# Patient Record
Sex: FEMALE | Race: WHITE | NOT HISPANIC OR LATINO | Employment: FULL TIME | URBAN - METROPOLITAN AREA
[De-identification: names, ages, dates, MRNs, and addresses within clinical notes are randomized per-mention and may not be internally consistent; named-entity substitution may affect disease eponyms.]

---

## 2017-07-26 ENCOUNTER — OFFICE VISIT (OUTPATIENT)
Dept: FAMILY MEDICINE | Facility: CLINIC | Age: 57
End: 2017-07-26
Payer: COMMERCIAL

## 2017-07-26 VITALS
RESPIRATION RATE: 16 BRPM | HEIGHT: 66 IN | BODY MASS INDEX: 25.55 KG/M2 | SYSTOLIC BLOOD PRESSURE: 124 MMHG | DIASTOLIC BLOOD PRESSURE: 82 MMHG | TEMPERATURE: 98 F | WEIGHT: 159 LBS

## 2017-07-26 DIAGNOSIS — H60.22 ACUTE MALIGNANT OTITIS EXTERNA OF LEFT EAR: ICD-10-CM

## 2017-07-26 DIAGNOSIS — Z00.00 ROUTINE HEALTH MAINTENANCE: ICD-10-CM

## 2017-07-26 DIAGNOSIS — J06.9 URI, ACUTE: ICD-10-CM

## 2017-07-26 PROCEDURE — 99213 OFFICE O/P EST LOW 20 MIN: CPT | Mod: ,,, | Performed by: FAMILY MEDICINE

## 2017-07-26 RX ORDER — AZELASTINE 1 MG/ML
SPRAY, METERED NASAL
COMMUNITY
Start: 2017-07-10 | End: 2017-07-26 | Stop reason: ALTCHOICE

## 2017-07-26 RX ORDER — CIPROFLOXACIN AND DEXAMETHASONE 3; 1 MG/ML; MG/ML
SUSPENSION/ DROPS AURICULAR (OTIC)
COMMUNITY
Start: 2017-07-10 | End: 2017-07-26 | Stop reason: ALTCHOICE

## 2017-07-26 RX ORDER — ACETAMINOPHEN AND CODEINE PHOSPHATE 300; 30 MG/1; MG/1
TABLET ORAL
COMMUNITY
Start: 2017-07-10 | End: 2017-07-26 | Stop reason: ALTCHOICE

## 2017-07-26 RX ORDER — CEFDINIR 300 MG/1
CAPSULE ORAL
COMMUNITY
Start: 2017-07-10 | End: 2017-07-26 | Stop reason: ALTCHOICE

## 2017-07-26 RX ORDER — MUPIROCIN 20 MG/G
OINTMENT TOPICAL
COMMUNITY
Start: 2017-07-10 | End: 2021-07-30

## 2017-07-26 NOTE — PROGRESS NOTES
Subjective:       Patient ID: Heidy Phillips is a 57 y.o. female.    Chief Complaint: Sinusitis and Otalgia    This a follow-up visit from urgent care she was seen with earache severe with a surrounding soft tissue swelling. Given Ceftin, cipro gtt.        Sinusitis   This is a recurrent (f/u) problem. The problem has been gradually improving since onset. There has been no fever. Her pain is at a severity of 1/10. Associated symptoms include congestion, coughing, ear pain, a hoarse voice, neck pain and a sore throat. Past treatments include nothing. The treatment provided mild relief.   Otalgia    Associated symptoms include coughing, neck pain and a sore throat.     Review of Systems   HENT: Positive for congestion, ear pain, hoarse voice and sore throat.    Respiratory: Positive for cough.    Musculoskeletal: Positive for neck pain.       Objective:      Physical Exam   HENT:   Head: Normocephalic and atraumatic.   Right Ear: Hearing, tympanic membrane, external ear and ear canal normal.   Left Ear: Hearing, tympanic membrane, external ear and ear canal normal.   Ears:    Nose: Nose normal. No mucosal edema, rhinorrhea, nose lacerations or sinus tenderness.   Mouth/Throat: Oropharynx is clear and moist.   Eyes: Conjunctivae and EOM are normal. Pupils are equal, round, and reactive to light. Right eye exhibits discharge. Left eye exhibits discharge. Scleral icterus is present.   Neck: Normal range of motion. Neck supple. No thyromegaly present.   Cardiovascular: Normal rate, regular rhythm, normal heart sounds and intact distal pulses.  Exam reveals no gallop and no friction rub.    No murmur heard.  Pulmonary/Chest:           Assessment:       1. 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 URI, acute    3. Routine health maintenance        Plan:       Heidy was seen today for sinusitis and otalgia.    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

## 2017-07-26 NOTE — PATIENT INSTRUCTIONS
Dry Ear Ear Drops:  For post-wash protection or itchy ears  Mix 1 oz white vinegar with 1oz baby oil and store in a dropper bottle.  Shake to mix prior to use.  Apply 3 drops, 3 times a day for 3 days. May continue use if needed.    Silicon ear tips

## 2017-09-06 ENCOUNTER — TELEPHONE (OUTPATIENT)
Dept: FAMILY MEDICINE | Facility: CLINIC | Age: 57
End: 2017-09-06

## 2017-09-06 NOTE — TELEPHONE ENCOUNTER
----- Message from Alpesh Campos MD sent at 9/6/2017  8:37 AM CDT -----  Mammogram negative. return to clinic in 1 year for reassessment

## 2017-09-15 ENCOUNTER — OFFICE VISIT (OUTPATIENT)
Dept: FAMILY MEDICINE | Facility: CLINIC | Age: 57
End: 2017-09-15
Payer: COMMERCIAL

## 2017-09-15 VITALS
TEMPERATURE: 99 F | WEIGHT: 158 LBS | DIASTOLIC BLOOD PRESSURE: 70 MMHG | HEIGHT: 67 IN | SYSTOLIC BLOOD PRESSURE: 110 MMHG | HEART RATE: 76 BPM | RESPIRATION RATE: 16 BRPM | BODY MASS INDEX: 24.8 KG/M2

## 2017-09-15 DIAGNOSIS — M12.819 ROTATOR CUFF ARTHROPATHY, UNSPECIFIED LATERALITY: ICD-10-CM

## 2017-09-15 DIAGNOSIS — Z00.00 WELL ADULT EXAM: Primary | ICD-10-CM

## 2017-09-15 DIAGNOSIS — F17.200 SMOKING: ICD-10-CM

## 2017-09-15 PROCEDURE — 99396 PREV VISIT EST AGE 40-64: CPT | Mod: ,,, | Performed by: FAMILY MEDICINE

## 2017-09-15 NOTE — PROGRESS NOTES
Subjective:       Patient ID: Heidy Phillips is a 57 y.o. female.    Chief Complaint: Annual Exam    If her annual visit complaint her ear infection has resolved.      Review of Systems   Constitutional: Negative for activity change, appetite change, chills, diaphoresis, fatigue, fever and unexpected weight change.   HENT: Positive for postnasal drip and rhinorrhea. Negative for congestion, dental problem, drooling, ear discharge, ear pain, facial swelling, hearing loss, mouth sores, nosebleeds, sinus pain, sinus pressure, sneezing, sore throat, tinnitus, trouble swallowing and voice change.    Eyes: Negative for photophobia, pain, discharge, redness, itching and visual disturbance.   Respiratory: Positive for cough and shortness of breath (episodic). Negative for apnea, choking, chest tightness, wheezing and stridor.    Cardiovascular: Positive for leg swelling (worse with activity). Negative for chest pain and palpitations.   Gastrointestinal: Negative for abdominal distention, abdominal pain, anal bleeding, blood in stool, constipation, diarrhea, nausea, rectal pain and vomiting.   Endocrine: Negative for cold intolerance, heat intolerance, polydipsia, polyphagia and polyuria.   Genitourinary: Negative for decreased urine volume, difficulty urinating, dyspareunia, dysuria, enuresis, flank pain, frequency, genital sores, hematuria, menstrual problem, pelvic pain, urgency, vaginal bleeding, vaginal discharge and vaginal pain.   Musculoskeletal: Positive for arthralgias (left shoulder back of shoulder blade.) and back pain (t spine R scapula). Negative for gait problem, joint swelling, myalgias, neck pain and neck stiffness.   Skin: Negative for color change, pallor, rash and wound.   Allergic/Immunologic: Positive for environmental allergies. Negative for food allergies and immunocompromised state.   Neurological: Negative for dizziness, tremors, seizures, syncope, facial asymmetry, speech difficulty,  weakness, light-headedness, numbness and headaches.   Hematological: Negative for adenopathy. Does not bruise/bleed easily.   Psychiatric/Behavioral: Negative for agitation, behavioral problems, confusion, decreased concentration, dysphoric mood, hallucinations, self-injury, sleep disturbance and suicidal ideas. The patient is not nervous/anxious and is not hyperactive.        Objective:      Physical Exam   Constitutional: She is oriented to person, place, and time. She appears well-developed and well-nourished. No distress.   HENT:   Head: Normocephalic and atraumatic.   Right Ear: External ear normal.   Left Ear: External ear normal.   Nose: Nose normal.   Mouth/Throat: Oropharynx is clear and moist. No oropharyngeal exudate.   Eyes: Conjunctivae and EOM are normal. Pupils are equal, round, and reactive to light. Right eye exhibits no discharge. Left eye exhibits no discharge. No scleral icterus.   Neck: Normal range of motion. Neck supple. No JVD present. No tracheal deviation present. No thyromegaly present.   Cardiovascular: Normal rate, regular rhythm, normal heart sounds and intact distal pulses.  Exam reveals no gallop and no friction rub.    No murmur heard.  Pulmonary/Chest: Effort normal and breath sounds normal. No stridor. No respiratory distress. She has no wheezes. She has no rales. She exhibits no tenderness.   Abdominal: Soft. Bowel sounds are normal. She exhibits no distension and no mass. There is no tenderness. There is no rebound and no guarding. No hernia.   Genitourinary: Vagina normal and uterus normal. Rectal exam shows guaiac negative stool. No vaginal discharge found.   Musculoskeletal: She exhibits no edema or deformity.        Right shoulder: She exhibits tenderness, pain, spasm and decreased strength. She exhibits normal range of motion.   Lymphadenopathy:     She has no cervical adenopathy.   Neurological: She is alert and oriented to person, place, and time. She has normal reflexes.  She displays normal reflexes. No cranial nerve deficit or sensory deficit. She exhibits normal muscle tone. Coordination normal.   Skin: Skin is warm and dry. Capillary refill takes less than 2 seconds. No rash noted. She is not diaphoretic. No erythema. No pallor.   Psychiatric: She has a normal mood and affect. Her behavior is normal. Judgment and thought content normal.       Assessment:       1. Well adult exam    2. Smoking    3. Rotator cuff arthropathy, unspecified laterality        Plan:       Heidy was seen today for annual exam.    Diagnoses and all orders for this visit:    Well adult exam  -     Hemoglobin A1c; Future  -     Basic metabolic panel; Future  -     Hepatic function panel; Future  -     Hepatitis C antibody; Future  -     Lipid panel; Future  -     Hemoglobin A1c  -     Basic metabolic panel  -     Hepatic function panel  -     Hepatitis C antibody  -     Lipid panel    Smoking  -     CT Chest Lung Screening Low Dose    Rotator cuff arthropathy, unspecified laterality  -     Ambulatory Referral to Physical/Occupational Therapy         Return in about 1 year (around 9/15/2018).

## 2017-09-15 NOTE — PATIENT INSTRUCTIONS
[employer] exer    Exercises at Your Workstation: Shoulders  Tight shoulders? Aching back? A few easy moves can help your shoulders and back feel better. Take a few minutes during your day to do these exercises, right at your desk. They'll loosen up your muscles, keep you more alert, and make a big difference in how you work and feel.  Breathe deeply as you do your exercises. Inhale through your nose, and exhale through your mouth.        Shoulder raise Back press   For your shoulders  Warm-up  · Drop your head gently to your chest. While breathing in, slowly roll your head up to your left shoulder. While breathing out, slowly roll your head back to center. Repeat to the right.  · Repeat 3 times on each side.  Shoulder raise  · Slowly raise your shoulders toward your ears. Hold for a few seconds.  · Slowly bring your shoulders down and relax.  · Repeat 3 times.  Back press  · Put your hands up, forearms raised.  · Push your arms back, squeezing your shoulder blades. Hold for a few seconds, then relax.  · Repeat 3 times.  If you feel pain while doing these stretching exercises, please stop and consult your doctor.   Date Last Reviewed: 12/28/2015  © 3141-4504 [employer]. [address] [place] [address], [place] [address]. All rights reserved. This information is not intended as a substitute for professional medical care. Always follow your healthcare professional's instructions.

## 2017-09-25 ENCOUNTER — TELEPHONE (OUTPATIENT)
Dept: FAMILY MEDICINE | Facility: CLINIC | Age: 57
End: 2017-09-25

## 2017-09-25 DIAGNOSIS — Z87.891 PERSONAL HISTORY OF TOBACCO USE, PRESENTING HAZARDS TO HEALTH: Primary | ICD-10-CM

## 2017-09-25 NOTE — TELEPHONE ENCOUNTER
----- Message from Ana Jha sent at 9/25/2017  1:28 PM CDT -----  Contact: pt   Please have  change the Dx on [address] To [phone]  CTP Code .  [facility] will only cover for these codes.  Order changed please sign

## 2017-09-28 LAB
ALBUMIN SERPL-MCNC: 4.6 G/DL (ref 3.1–4.7)
ALP SERPL-CCNC: 71 IU/L (ref 40–104)
ALT (SGPT): 12 IU/L (ref 3–33)
AST SERPL-CCNC: 16 IU/L (ref 10–40)
BILIRUB SERPL-MCNC: 0.3 MG/DL (ref 0.3–1)
BILIRUBIN DIRECT+TOT PNL SERPL-MCNC: <0.1 MG/DL (ref 0–0.2)
BUN SERPL-MCNC: 21 MG/DL (ref 8–20)
CALCIUM SERPL-MCNC: 9.3 MG/DL (ref 7.7–10.4)
CHLORIDE: 105 MMOL/L (ref 98–110)
CO2 SERPL-SCNC: 26.9 MMOL/L (ref 22.8–31.6)
CREATININE: 0.94 MG/DL (ref 0.6–1.4)
GLUCOSE: 89 MG/DL (ref 70–99)
HBA1C MFR BLD: 5.4 % (ref 3.1–6.5)
POTASSIUM SERPL-SCNC: 4.2 MMOL/L (ref 3.5–5)
PROT SERPL-MCNC: 7.4 G/DL (ref 6–8.2)
SODIUM: 142 MMOL/L (ref 134–144)

## 2017-09-29 LAB — HCV AB SERPL QL IA: <0.1 S/CO RATIO (ref 0–0.9)

## 2017-10-30 PROBLEM — Z00.00 ROUTINE HEALTH MAINTENANCE: Status: RESOLVED | Noted: 2017-07-26 | Resolved: 2017-10-30

## 2020-07-13 ENCOUNTER — TELEPHONE (OUTPATIENT)
Dept: DERMATOLOGY | Facility: CLINIC | Age: 60
End: 2020-07-13

## 2020-07-13 NOTE — TELEPHONE ENCOUNTER
----- Message from Paul Locke sent at 7/13/2020  2:37 PM CDT -----  Contact: pt  Type:  Sooner Apoointment Request    Caller is requesting a sooner appointment.  Caller declined first available appointment listed below.  Caller will not accept being placed on the waitlist and is requesting a message be sent to doctor.    Name of Caller:  pt  When is the first available appointment?  07/24/2020  Symptoms:  cyst on top of head  Best Call Back Number:  [phone]  Additional Information:

## 2020-07-14 ENCOUNTER — OFFICE VISIT (OUTPATIENT)
Dept: DERMATOLOGY | Facility: CLINIC | Age: 60
End: 2020-07-14
Payer: COMMERCIAL

## 2020-07-14 DIAGNOSIS — L02.91 ABSCESS: Primary | ICD-10-CM

## 2020-07-14 DIAGNOSIS — L72.0 EPIDERMAL CYST: ICD-10-CM

## 2020-07-14 DIAGNOSIS — L73.9 FOLLICULITIS: ICD-10-CM

## 2020-07-14 PROCEDURE — 99202 OFFICE O/P NEW SF 15 MIN: CPT | Mod: S$GLB,,, | Performed by: DERMATOLOGY

## 2020-07-14 PROCEDURE — 99999 PR PBB SHADOW E&M-EST. PATIENT-LVL II: ICD-10-PCS | Mod: PBBFAC,,, | Performed by: DERMATOLOGY

## 2020-07-14 PROCEDURE — 99999 PR PBB SHADOW E&M-EST. PATIENT-LVL II: CPT | Mod: PBBFAC,,, | Performed by: DERMATOLOGY

## 2020-07-14 PROCEDURE — 99202 PR OFFICE/OUTPT VISIT, NEW, LEVL II, 15-29 MIN: ICD-10-PCS | Mod: S$GLB,,, | Performed by: DERMATOLOGY

## 2020-07-14 RX ORDER — PREDNISONE 10 MG/1
10 TABLET ORAL DAILY
Qty: 4 TABLET | Refills: 0 | Status: SHIPPED | OUTPATIENT
Start: 2020-07-14 | End: 2020-07-18

## 2020-07-14 RX ORDER — CEPHALEXIN 500 MG/1
500 CAPSULE ORAL 3 TIMES DAILY
Qty: 30 CAPSULE | Refills: 0 | Status: SHIPPED | OUTPATIENT
Start: 2020-07-14 | End: 2020-07-24

## 2020-07-28 ENCOUNTER — OFFICE VISIT (OUTPATIENT)
Dept: DERMATOLOGY | Facility: CLINIC | Age: 60
End: 2020-07-28
Payer: COMMERCIAL

## 2020-07-28 DIAGNOSIS — L40.9 SCALP PSORIASIS: ICD-10-CM

## 2020-07-28 DIAGNOSIS — L72.0 EPIDERMAL CYST: ICD-10-CM

## 2020-07-28 DIAGNOSIS — L65.9 ALOPECIA: ICD-10-CM

## 2020-07-28 DIAGNOSIS — L02.91 ABSCESS: Primary | ICD-10-CM

## 2020-07-28 PROCEDURE — 99999 PR PBB SHADOW E&M-EST. PATIENT-LVL II: CPT | Mod: PBBFAC,,, | Performed by: DERMATOLOGY

## 2020-07-28 PROCEDURE — 99999 PR PBB SHADOW E&M-EST. PATIENT-LVL II: ICD-10-PCS | Mod: PBBFAC,,, | Performed by: DERMATOLOGY

## 2020-07-28 PROCEDURE — 99213 PR OFFICE/OUTPT VISIT, EST, LEVL III, 20-29 MIN: ICD-10-PCS | Mod: S$GLB,,, | Performed by: DERMATOLOGY

## 2020-07-28 PROCEDURE — 99213 OFFICE O/P EST LOW 20 MIN: CPT | Mod: S$GLB,,, | Performed by: DERMATOLOGY

## 2020-07-28 RX ORDER — FLUOCINONIDE TOPICAL SOLUTION USP, 0.05% 0.5 MG/ML
SOLUTION TOPICAL
Qty: 60 ML | Refills: 2 | Status: SHIPPED | OUTPATIENT
Start: 2020-07-28 | End: 2023-03-01

## 2020-07-28 NOTE — PROGRESS NOTES
Subjective:       Patient ID:  Heidy Phillips is a 60 y.o. female who presents for 2 follow up head cyst. States doing better, has disappeared.  Chief Complaint   Patient presents with    Follow-up     head cyst     Bump drained and flattened out.  No pain.  Done with pills.    Also has years of off and on rash of post scalp.  Itches.      Review of Systems   Constitutional: Negative for fever.   HENT: Negative for sore throat.    Respiratory: Negative for cough.    Skin: Positive for itching and rash.        Objective:    Physical Exam   Constitutional: She appears well-developed and well-nourished.   Eyes: No conjunctival no injection.   Lymphadenopathy:     She has no cervical adenopathy.   Neurological: She is alert and oriented to person, place, and time.   Psychiatric: She has a normal mood and affect.   Skin:   Areas Examined (abnormalities noted in diagram):   Scalp / Hair Palpated and Inspected  Head / Face Inspection Performed  Neck Inspection Performed  RUE Inspected  LUE Inspection Performed              Diagram Legend     Erythematous scaling macule/papule c/w actinic keratosis       Vascular papule c/w angioma      Pigmented verrucoid papule/plaque c/w seborrheic keratosis      Yellow umbilicated papule c/w sebaceous hyperplasia      Irregularly shaped tan macule c/w lentigo     1-2 mm smooth white papules consistent with Milia      Movable subcutaneous cyst with punctum c/w epidermal inclusion cyst      Subcutaneous movable cyst c/w pilar cyst      Firm pink to brown papule c/w dermatofibroma      Pedunculated fleshy papule(s) c/w skin tag(s)      Evenly pigmented macule c/w junctional nevus     Mildly variegated pigmented, slightly irregular-bordered macule c/w mildly atypical nevus      Flesh colored to evenly pigmented papule c/w intradermal nevus       Pink pearly papule/plaque c/w basal cell carcinoma      Erythematous hyperkeratotic cursted plaque c/w SCC      Surgical scar with no  sign of skin cancer recurrence      Open and closed comedones      Inflammatory papules and pustules      Verrucoid papule consistent consistent with wart     Erythematous eczematous patches and plaques     Dystrophic onycholytic nail with subungual debris c/w onychomycosis     Umbilicated papule    Erythematous-base heme-crusted tan verrucoid plaque consistent with inflamed seborrheic keratosis     Erythematous Silvery Scaling Plaque c/w Psoriasis     See annotation      Assessment / Plan:        Abscess  Resolved.  Patient to contact us for earlier follow up if anything recurs.    Epidermal cyst  Resolved.  Patient to contact us for earlier follow up if anything recurs.  Discussed with patient the likelihood that this lesion is an epidermal cyst caused by an involuted lining of skin with dead skin trapped inside.  Cysts do not have a malignant potential but can become infected and turn into abscesses with possible cellulitis.  Discussed the option of warm compresses if infected with oral antibiotics.  Discussed the option of surgical excision with scar, possible recurrence, hematoma, and infection.  Patient to consider these options.    Alopecia  Discussed with patient the etiology and pathogenesis of the disease or skin lesion(s) and possible treatments and aggravators.    Regrowth should occur slowly.  Patient and or guardian to monitor this area/lesion or these areas/lesions for changes or worsening.  Patient and or guardian to contact us if any changes are noted for such.    Scalp psoriasis  -     fluocinonide (LIDEX) 0.05 % external solution; AAA scalp qday - bid prn pruritus or rash  Dispense: 60 mL; Refill: 2  No hot water bathing reviewed.  Patient to start 5% crude tar or otc weaker shampoo to be used as scalp soaks for at least 3 minutes, longer if possible, per their regular shampooing schedule.  Reviewed with patient different treatment options and associated risks.  Discussed with patient the etiology  and pathogenesis of the disease or skin lesion(s) and possible treatments and aggravators.    Proper application of medications and or care for affected area(s) and condition(s) reviewed.             Follow up in about 1 year (around 7/28/2021).

## 2021-06-07 DIAGNOSIS — Z12.31 ENCOUNTER FOR SCREENING MAMMOGRAM FOR MALIGNANT NEOPLASM OF BREAST: Primary | ICD-10-CM

## 2021-06-11 ENCOUNTER — HOSPITAL ENCOUNTER (OUTPATIENT)
Dept: RADIOLOGY | Facility: HOSPITAL | Age: 61
Discharge: HOME | End: 2021-06-11
Attending: SPECIALIST
Payer: COMMERCIAL

## 2021-06-11 DIAGNOSIS — Z12.31 ENCOUNTER FOR SCREENING MAMMOGRAM FOR MALIGNANT NEOPLASM OF BREAST: ICD-10-CM

## 2021-06-11 PROCEDURE — 77067 SCR MAMMO BI INCL CAD: CPT | Mod: TC,PO

## 2021-07-08 ENCOUNTER — HOSPITAL ENCOUNTER (OUTPATIENT)
Dept: RADIOLOGY | Facility: HOSPITAL | Age: 61
Discharge: HOME | End: 2021-07-08
Attending: SPECIALIST
Payer: COMMERCIAL

## 2021-07-08 DIAGNOSIS — R92.2 INCONCLUSIVE MAMMOGRAM: ICD-10-CM

## 2021-07-08 PROCEDURE — 76642 ULTRASOUND BREAST LIMITED: CPT | Mod: TC,PO,RT

## 2021-07-30 ENCOUNTER — HOSPITAL ENCOUNTER (OUTPATIENT)
Dept: PREADMISSION TESTING | Facility: HOSPITAL | Age: 61
Discharge: HOME | End: 2021-07-30
Attending: SURGERY
Payer: COMMERCIAL

## 2021-07-30 ENCOUNTER — HOSPITAL ENCOUNTER (OUTPATIENT)
Dept: RADIOLOGY | Facility: HOSPITAL | Age: 61
Discharge: HOME | End: 2021-07-30
Attending: SURGERY
Payer: COMMERCIAL

## 2021-07-30 VITALS
HEIGHT: 66 IN | WEIGHT: 150.88 LBS | BODY MASS INDEX: 24.25 KG/M2 | RESPIRATION RATE: 17 BRPM | OXYGEN SATURATION: 96 % | TEMPERATURE: 97 F | DIASTOLIC BLOOD PRESSURE: 88 MMHG | HEART RATE: 60 BPM | SYSTOLIC BLOOD PRESSURE: 139 MMHG

## 2021-07-30 DIAGNOSIS — N63.11 BREAST LUMP ON RIGHT SIDE AT 10 O'CLOCK POSITION: ICD-10-CM

## 2021-07-30 DIAGNOSIS — Z01.818 PRE-OP TESTING: Primary | ICD-10-CM

## 2021-07-30 DIAGNOSIS — N63.11 BREAST LUMP ON RIGHT SIDE AT 10 O'CLOCK POSITION: Primary | ICD-10-CM

## 2021-07-30 DIAGNOSIS — N63.11 MASS OF UPPER OUTER QUADRANT OF RIGHT BREAST: ICD-10-CM

## 2021-07-30 PROCEDURE — 19285 PERQ DEV BREAST 1ST US IMAG: CPT

## 2021-07-30 PROCEDURE — 93010 ELECTROCARDIOGRAM REPORT: CPT | Mod: ,,, | Performed by: INTERNAL MEDICINE

## 2021-07-30 PROCEDURE — 71046 X-RAY EXAM CHEST 2 VIEWS: CPT | Mod: TC

## 2021-07-30 PROCEDURE — A4550 SURGICAL TRAYS: HCPCS

## 2021-07-30 PROCEDURE — 93010 EKG 12-LEAD: ICD-10-PCS | Mod: ,,, | Performed by: INTERNAL MEDICINE

## 2021-07-30 PROCEDURE — 93005 ELECTROCARDIOGRAM TRACING: CPT | Performed by: INTERNAL MEDICINE

## 2021-07-30 RX ORDER — CEFAZOLIN SODIUM 1 G/3ML
2 INJECTION, POWDER, FOR SOLUTION INTRAMUSCULAR; INTRAVENOUS ONCE
Status: CANCELLED | OUTPATIENT
Start: 2021-07-02

## 2021-08-02 ENCOUNTER — HOSPITAL ENCOUNTER (OUTPATIENT)
Facility: HOSPITAL | Age: 61
Discharge: HOME | End: 2021-08-02
Attending: SURGERY | Admitting: SURGERY
Payer: COMMERCIAL

## 2021-08-02 ENCOUNTER — ANESTHESIA (OUTPATIENT)
Dept: SURGERY | Facility: HOSPITAL | Age: 61
End: 2021-08-02
Payer: COMMERCIAL

## 2021-08-02 ENCOUNTER — ANESTHESIA EVENT (OUTPATIENT)
Dept: SURGERY | Facility: HOSPITAL | Age: 61
End: 2021-08-02
Payer: COMMERCIAL

## 2021-08-02 VITALS
HEIGHT: 66 IN | RESPIRATION RATE: 16 BRPM | SYSTOLIC BLOOD PRESSURE: 138 MMHG | TEMPERATURE: 98 F | DIASTOLIC BLOOD PRESSURE: 89 MMHG | HEART RATE: 74 BPM | WEIGHT: 151 LBS | BODY MASS INDEX: 24.27 KG/M2 | OXYGEN SATURATION: 100 %

## 2021-08-02 DIAGNOSIS — N60.91 ATYPICAL DUCTAL HYPERPLASIA OF RIGHT BREAST: Primary | ICD-10-CM

## 2021-08-02 DIAGNOSIS — Z01.818 PRE-OP TESTING: ICD-10-CM

## 2021-08-02 PROCEDURE — C9290 INJ, BUPIVACAINE LIPOSOME: HCPCS | Performed by: SURGERY

## 2021-08-02 PROCEDURE — 25000003 PHARM REV CODE 250: Performed by: NURSE ANESTHETIST, CERTIFIED REGISTERED

## 2021-08-02 PROCEDURE — 71000033 HC RECOVERY, INTIAL HOUR: Performed by: SURGERY

## 2021-08-02 PROCEDURE — 25000003 PHARM REV CODE 250: Performed by: SURGERY

## 2021-08-02 PROCEDURE — 36000706: Performed by: SURGERY

## 2021-08-02 PROCEDURE — 63600175 PHARM REV CODE 636 W HCPCS: Performed by: SURGERY

## 2021-08-02 PROCEDURE — 36000707: Performed by: SURGERY

## 2021-08-02 PROCEDURE — 71000015 HC POSTOP RECOV 1ST HR: Performed by: SURGERY

## 2021-08-02 PROCEDURE — 27000671 HC TUBING MICROBORE EXT: Performed by: ANESTHESIOLOGY

## 2021-08-02 PROCEDURE — 37000008 HC ANESTHESIA 1ST 15 MINUTES: Performed by: SURGERY

## 2021-08-02 PROCEDURE — 37000009 HC ANESTHESIA EA ADD 15 MINS: Performed by: SURGERY

## 2021-08-02 PROCEDURE — 63600175 PHARM REV CODE 636 W HCPCS: Performed by: NURSE ANESTHETIST, CERTIFIED REGISTERED

## 2021-08-02 PROCEDURE — 27000673 HC TUBING BLOOD Y: Performed by: ANESTHESIOLOGY

## 2021-08-02 PROCEDURE — 63600175 PHARM REV CODE 636 W HCPCS: Performed by: ANESTHESIOLOGY

## 2021-08-02 PROCEDURE — 27201107 HC STYLET, STANDARD: Performed by: ANESTHESIOLOGY

## 2021-08-02 PROCEDURE — 27202105 HC BIS BILATERAL SENSOR: Performed by: ANESTHESIOLOGY

## 2021-08-02 PROCEDURE — 27201423 OPTIME MED/SURG SUP & DEVICES STERILE SUPPLY: Performed by: SURGERY

## 2021-08-02 PROCEDURE — 27000653 HC CATH, IV CATHLN: Performed by: ANESTHESIOLOGY

## 2021-08-02 PROCEDURE — 25000003 PHARM REV CODE 250: Performed by: ANESTHESIOLOGY

## 2021-08-02 RX ORDER — SODIUM CHLORIDE 0.9 % (FLUSH) 0.9 %
10 SYRINGE (ML) INJECTION
Status: DISCONTINUED | OUTPATIENT
Start: 2021-08-02 | End: 2021-08-02 | Stop reason: HOSPADM

## 2021-08-02 RX ORDER — PROPOFOL 10 MG/ML
VIAL (ML) INTRAVENOUS
Status: DISCONTINUED | OUTPATIENT
Start: 2021-08-02 | End: 2021-08-02

## 2021-08-02 RX ORDER — MIDAZOLAM HYDROCHLORIDE 1 MG/ML
INJECTION INTRAMUSCULAR; INTRAVENOUS
Status: DISCONTINUED | OUTPATIENT
Start: 2021-08-02 | End: 2021-08-02

## 2021-08-02 RX ORDER — BUPIVACAINE HCL/EPINEPHRINE 0.25-.0005
VIAL (ML) INJECTION
Status: DISCONTINUED | OUTPATIENT
Start: 2021-08-02 | End: 2021-08-02 | Stop reason: HOSPADM

## 2021-08-02 RX ORDER — DIPHENHYDRAMINE HYDROCHLORIDE 50 MG/ML
25 INJECTION INTRAMUSCULAR; INTRAVENOUS EVERY 6 HOURS PRN
Status: DISCONTINUED | OUTPATIENT
Start: 2021-08-02 | End: 2021-08-02 | Stop reason: HOSPADM

## 2021-08-02 RX ORDER — OXYCODONE HYDROCHLORIDE 5 MG/1
5 TABLET ORAL EVERY 4 HOURS PRN
Status: DISCONTINUED | OUTPATIENT
Start: 2021-08-02 | End: 2021-08-02 | Stop reason: HOSPADM

## 2021-08-02 RX ORDER — ONDANSETRON 2 MG/ML
INJECTION INTRAMUSCULAR; INTRAVENOUS
Status: DISCONTINUED | OUTPATIENT
Start: 2021-08-02 | End: 2021-08-02

## 2021-08-02 RX ORDER — HYDROCODONE BITARTRATE AND ACETAMINOPHEN 5; 325 MG/1; MG/1
1 TABLET ORAL EVERY 8 HOURS PRN
Qty: 12 TABLET | Refills: 0 | Status: ON HOLD | OUTPATIENT
Start: 2021-08-02 | End: 2021-08-17 | Stop reason: SDUPTHER

## 2021-08-02 RX ORDER — ONDANSETRON 4 MG/1
4 TABLET, ORALLY DISINTEGRATING ORAL ONCE
Status: DISCONTINUED | OUTPATIENT
Start: 2021-08-02 | End: 2021-08-02 | Stop reason: HOSPADM

## 2021-08-02 RX ORDER — HYDROCODONE BITARTRATE AND ACETAMINOPHEN 5; 325 MG/1; MG/1
1 TABLET ORAL EVERY 6 HOURS PRN
Status: CANCELLED | OUTPATIENT
Start: 2021-08-02

## 2021-08-02 RX ORDER — OXYCODONE HYDROCHLORIDE 5 MG/1
5 TABLET ORAL
Status: DISCONTINUED | OUTPATIENT
Start: 2021-08-02 | End: 2021-08-02 | Stop reason: HOSPADM

## 2021-08-02 RX ORDER — DEXAMETHASONE SODIUM PHOSPHATE 4 MG/ML
INJECTION, SOLUTION INTRA-ARTICULAR; INTRALESIONAL; INTRAMUSCULAR; INTRAVENOUS; SOFT TISSUE
Status: DISCONTINUED | OUTPATIENT
Start: 2021-08-02 | End: 2021-08-02

## 2021-08-02 RX ORDER — DEXAMETHASONE SODIUM PHOSPHATE 4 MG/ML
4 INJECTION, SOLUTION INTRA-ARTICULAR; INTRALESIONAL; INTRAMUSCULAR; INTRAVENOUS; SOFT TISSUE ONCE
Status: DISCONTINUED | OUTPATIENT
Start: 2021-08-02 | End: 2021-08-02 | Stop reason: HOSPADM

## 2021-08-02 RX ORDER — ACETAMINOPHEN 10 MG/ML
1000 INJECTION, SOLUTION INTRAVENOUS ONCE
Status: CANCELLED | OUTPATIENT
Start: 2021-08-02 | End: 2021-08-02

## 2021-08-02 RX ORDER — SUCCINYLCHOLINE CHLORIDE 20 MG/ML
INJECTION INTRAMUSCULAR; INTRAVENOUS
Status: DISCONTINUED | OUTPATIENT
Start: 2021-08-02 | End: 2021-08-02

## 2021-08-02 RX ORDER — GABAPENTIN 100 MG/1
100 CAPSULE ORAL ONCE
Status: COMPLETED | OUTPATIENT
Start: 2021-08-02 | End: 2021-08-02

## 2021-08-02 RX ORDER — ACETAMINOPHEN 10 MG/ML
INJECTION, SOLUTION INTRAVENOUS
Status: DISCONTINUED | OUTPATIENT
Start: 2021-08-02 | End: 2021-08-02

## 2021-08-02 RX ORDER — SCOLOPAMINE TRANSDERMAL SYSTEM 1 MG/1
1 PATCH, EXTENDED RELEASE TRANSDERMAL
Status: DISCONTINUED | OUTPATIENT
Start: 2021-08-02 | End: 2021-08-02 | Stop reason: HOSPADM

## 2021-08-02 RX ORDER — ONDANSETRON 2 MG/ML
4 INJECTION INTRAMUSCULAR; INTRAVENOUS DAILY PRN
Status: DISCONTINUED | OUTPATIENT
Start: 2021-08-02 | End: 2021-08-02 | Stop reason: HOSPADM

## 2021-08-02 RX ORDER — LIDOCAINE HYDROCHLORIDE 10 MG/ML
INJECTION, SOLUTION EPIDURAL; INFILTRATION; INTRACAUDAL; PERINEURAL
Status: DISCONTINUED | OUTPATIENT
Start: 2021-08-02 | End: 2021-08-02

## 2021-08-02 RX ORDER — FAMOTIDINE 10 MG/ML
INJECTION INTRAVENOUS
Status: DISCONTINUED | OUTPATIENT
Start: 2021-08-02 | End: 2021-08-02

## 2021-08-02 RX ORDER — ONDANSETRON 2 MG/ML
4 INJECTION INTRAMUSCULAR; INTRAVENOUS EVERY 12 HOURS PRN
Status: CANCELLED | OUTPATIENT
Start: 2021-08-02

## 2021-08-02 RX ORDER — ACETAMINOPHEN 325 MG/1
650 TABLET ORAL EVERY 4 HOURS PRN
Status: CANCELLED | OUTPATIENT
Start: 2021-08-02

## 2021-08-02 RX ORDER — CEFAZOLIN SODIUM 2 G/50ML
2 SOLUTION INTRAVENOUS ONCE
Status: COMPLETED | OUTPATIENT
Start: 2021-08-02 | End: 2021-08-02

## 2021-08-02 RX ORDER — ROCURONIUM BROMIDE 10 MG/ML
INJECTION, SOLUTION INTRAVENOUS
Status: DISCONTINUED | OUTPATIENT
Start: 2021-08-02 | End: 2021-08-02

## 2021-08-02 RX ORDER — PROCHLORPERAZINE EDISYLATE 5 MG/ML
5 INJECTION INTRAMUSCULAR; INTRAVENOUS EVERY 30 MIN PRN
Status: DISCONTINUED | OUTPATIENT
Start: 2021-08-02 | End: 2021-08-02 | Stop reason: HOSPADM

## 2021-08-02 RX ORDER — FENTANYL CITRATE 50 UG/ML
INJECTION, SOLUTION INTRAMUSCULAR; INTRAVENOUS
Status: DISCONTINUED | OUTPATIENT
Start: 2021-08-02 | End: 2021-08-02

## 2021-08-02 RX ORDER — EPHEDRINE SULFATE 50 MG/ML
INJECTION, SOLUTION INTRAVENOUS
Status: DISCONTINUED | OUTPATIENT
Start: 2021-08-02 | End: 2021-08-02

## 2021-08-02 RX ORDER — SODIUM CHLORIDE, SODIUM LACTATE, POTASSIUM CHLORIDE, CALCIUM CHLORIDE 600; 310; 30; 20 MG/100ML; MG/100ML; MG/100ML; MG/100ML
INJECTION, SOLUTION INTRAVENOUS CONTINUOUS PRN
Status: DISCONTINUED | OUTPATIENT
Start: 2021-08-02 | End: 2021-08-02

## 2021-08-02 RX ORDER — HYDROMORPHONE HYDROCHLORIDE 1 MG/ML
0.2 INJECTION, SOLUTION INTRAMUSCULAR; INTRAVENOUS; SUBCUTANEOUS
Status: DISCONTINUED | OUTPATIENT
Start: 2021-08-02 | End: 2021-08-02 | Stop reason: HOSPADM

## 2021-08-02 RX ORDER — MEPERIDINE HYDROCHLORIDE 50 MG/ML
12.5 INJECTION INTRAMUSCULAR; INTRAVENOUS; SUBCUTANEOUS EVERY 10 MIN PRN
Status: DISCONTINUED | OUTPATIENT
Start: 2021-08-02 | End: 2021-08-02 | Stop reason: HOSPADM

## 2021-08-02 RX ORDER — DIPHENHYDRAMINE HYDROCHLORIDE 50 MG/ML
12.5 INJECTION INTRAMUSCULAR; INTRAVENOUS EVERY 4 HOURS
Status: DISCONTINUED | OUTPATIENT
Start: 2021-08-02 | End: 2021-08-02 | Stop reason: HOSPADM

## 2021-08-02 RX ADMIN — LIDOCAINE HYDROCHLORIDE 80 MG: 10 INJECTION, SOLUTION EPIDURAL; INFILTRATION; INTRACAUDAL; PERINEURAL at 07:08

## 2021-08-02 RX ADMIN — SUGAMMADEX 200 MG: 100 INJECTION, SOLUTION INTRAVENOUS at 08:08

## 2021-08-02 RX ADMIN — ACETAMINOPHEN 1000 MG: 10 INJECTION, SOLUTION INTRAVENOUS at 07:08

## 2021-08-02 RX ADMIN — FENTANYL CITRATE 50 MCG: 50 INJECTION INTRAMUSCULAR; INTRAVENOUS at 08:08

## 2021-08-02 RX ADMIN — SODIUM CHLORIDE, SODIUM LACTATE, POTASSIUM CHLORIDE, AND CALCIUM CHLORIDE: .6; .31; .03; .02 INJECTION, SOLUTION INTRAVENOUS at 07:08

## 2021-08-02 RX ADMIN — EPHEDRINE SULFATE 10 MG: 50 INJECTION, SOLUTION INTRAVENOUS at 08:08

## 2021-08-02 RX ADMIN — CEFAZOLIN SODIUM 2 G: 2 SOLUTION INTRAVENOUS at 07:08

## 2021-08-02 RX ADMIN — ROCURONIUM BROMIDE 5 MG: 10 INJECTION, SOLUTION INTRAVENOUS at 07:08

## 2021-08-02 RX ADMIN — ONDANSETRON 4 MG: 2 INJECTION INTRAMUSCULAR; INTRAVENOUS at 07:08

## 2021-08-02 RX ADMIN — GABAPENTIN 100 MG: 100 CAPSULE ORAL at 06:08

## 2021-08-02 RX ADMIN — ONDANSETRON 4 MG: 2 INJECTION INTRAMUSCULAR; INTRAVENOUS at 09:08

## 2021-08-02 RX ADMIN — MIDAZOLAM HYDROCHLORIDE 2 MG: 1 INJECTION, SOLUTION INTRAMUSCULAR; INTRAVENOUS at 07:08

## 2021-08-02 RX ADMIN — FAMOTIDINE 20 MG: 10 INJECTION, SOLUTION INTRAVENOUS at 07:08

## 2021-08-02 RX ADMIN — DEXAMETHASONE SODIUM PHOSPHATE 8 MG: 4 INJECTION, SOLUTION INTRAMUSCULAR; INTRAVENOUS at 07:08

## 2021-08-02 RX ADMIN — SUCCINYLCHOLINE CHLORIDE 140 MG: 20 INJECTION, SOLUTION INTRAMUSCULAR; INTRAVENOUS at 07:08

## 2021-08-02 RX ADMIN — PROPOFOL 120 MG: 10 INJECTION, EMULSION INTRAVENOUS at 07:08

## 2021-08-02 RX ADMIN — SODIUM CHLORIDE, SODIUM LACTATE, POTASSIUM CHLORIDE, AND CALCIUM CHLORIDE: .6; .31; .03; .02 INJECTION, SOLUTION INTRAVENOUS at 08:08

## 2021-08-02 RX ADMIN — SCOPOLAMINE 1 PATCH: 1 PATCH TRANSDERMAL at 06:08

## 2021-08-02 RX ADMIN — FENTANYL CITRATE 50 MCG: 50 INJECTION INTRAMUSCULAR; INTRAVENOUS at 07:08

## 2021-08-16 ENCOUNTER — HOSPITAL ENCOUNTER (OUTPATIENT)
Dept: PREADMISSION TESTING | Facility: HOSPITAL | Age: 61
Discharge: HOME | End: 2021-08-16
Attending: SURGERY
Payer: COMMERCIAL

## 2021-08-16 VITALS
HEIGHT: 66 IN | SYSTOLIC BLOOD PRESSURE: 116 MMHG | BODY MASS INDEX: 24.41 KG/M2 | WEIGHT: 151.88 LBS | DIASTOLIC BLOOD PRESSURE: 76 MMHG | RESPIRATION RATE: 16 BRPM | TEMPERATURE: 98 F | OXYGEN SATURATION: 98 % | HEART RATE: 73 BPM

## 2021-08-16 DIAGNOSIS — Z01.818 PRE-OP TESTING: ICD-10-CM

## 2021-08-16 LAB — SARS-COV-2 RDRP RESP QL NAA+PROBE: NEGATIVE

## 2021-08-16 PROCEDURE — U0002 COVID-19 LAB TEST NON-CDC: HCPCS | Performed by: SURGERY

## 2021-08-16 RX ORDER — CEFAZOLIN SODIUM 2 G/50ML
2 SOLUTION INTRAVENOUS ONCE
Status: CANCELLED | OUTPATIENT
Start: 2021-08-17

## 2021-08-17 ENCOUNTER — HOSPITAL ENCOUNTER (OUTPATIENT)
Facility: HOSPITAL | Age: 61
Discharge: HOME | End: 2021-08-17
Attending: SURGERY | Admitting: SURGERY
Payer: COMMERCIAL

## 2021-08-17 ENCOUNTER — ANESTHESIA (OUTPATIENT)
Dept: SURGERY | Facility: HOSPITAL | Age: 61
End: 2021-08-17
Payer: COMMERCIAL

## 2021-08-17 ENCOUNTER — ANESTHESIA EVENT (OUTPATIENT)
Dept: SURGERY | Facility: HOSPITAL | Age: 61
End: 2021-08-17
Payer: COMMERCIAL

## 2021-08-17 VITALS
SYSTOLIC BLOOD PRESSURE: 129 MMHG | TEMPERATURE: 98 F | DIASTOLIC BLOOD PRESSURE: 79 MMHG | HEART RATE: 72 BPM | OXYGEN SATURATION: 97 % | RESPIRATION RATE: 15 BRPM

## 2021-08-17 DIAGNOSIS — D05.11 DUCTAL CARCINOMA IN SITU (DCIS) OF RIGHT BREAST: Primary | ICD-10-CM

## 2021-08-17 DIAGNOSIS — Z01.818 PRE-OP TESTING: ICD-10-CM

## 2021-08-17 PROCEDURE — A4648 IMPLANTABLE TISSUE MARKER: HCPCS | Performed by: SURGERY

## 2021-08-17 PROCEDURE — 27202107 HC XP QUATRO SENSOR: Performed by: ANESTHESIOLOGY

## 2021-08-17 PROCEDURE — C9290 INJ, BUPIVACAINE LIPOSOME: HCPCS | Performed by: SURGERY

## 2021-08-17 PROCEDURE — 27000657 HC HEADREST, PRONE: Performed by: ANESTHESIOLOGY

## 2021-08-17 PROCEDURE — 27000673 HC TUBING BLOOD Y: Performed by: ANESTHESIOLOGY

## 2021-08-17 PROCEDURE — 37000009 HC ANESTHESIA EA ADD 15 MINS: Performed by: SURGERY

## 2021-08-17 PROCEDURE — 63600175 PHARM REV CODE 636 W HCPCS: Performed by: NURSE ANESTHETIST, CERTIFIED REGISTERED

## 2021-08-17 PROCEDURE — 27201107 HC STYLET, STANDARD: Performed by: ANESTHESIOLOGY

## 2021-08-17 PROCEDURE — 36000707: Performed by: SURGERY

## 2021-08-17 PROCEDURE — 27000671 HC TUBING MICROBORE EXT: Performed by: ANESTHESIOLOGY

## 2021-08-17 PROCEDURE — 63600175 PHARM REV CODE 636 W HCPCS: Performed by: SURGERY

## 2021-08-17 PROCEDURE — 25000003 PHARM REV CODE 250: Performed by: NURSE ANESTHETIST, CERTIFIED REGISTERED

## 2021-08-17 PROCEDURE — 63600175 PHARM REV CODE 636 W HCPCS: Performed by: ANESTHESIOLOGY

## 2021-08-17 PROCEDURE — 37000008 HC ANESTHESIA 1ST 15 MINUTES: Performed by: SURGERY

## 2021-08-17 PROCEDURE — 71000033 HC RECOVERY, INTIAL HOUR: Performed by: SURGERY

## 2021-08-17 PROCEDURE — 25000003 PHARM REV CODE 250: Performed by: ANESTHESIOLOGY

## 2021-08-17 PROCEDURE — 27000284 HC CANNULA NASAL: Performed by: ANESTHESIOLOGY

## 2021-08-17 PROCEDURE — 71000015 HC POSTOP RECOV 1ST HR: Performed by: SURGERY

## 2021-08-17 PROCEDURE — 36000706: Performed by: SURGERY

## 2021-08-17 PROCEDURE — 27201423 OPTIME MED/SURG SUP & DEVICES STERILE SUPPLY: Performed by: SURGERY

## 2021-08-17 PROCEDURE — 25000003 PHARM REV CODE 250: Performed by: SURGERY

## 2021-08-17 DEVICE — IMPLANTABLE DEVICE: Type: IMPLANTABLE DEVICE | Site: BREAST | Status: FUNCTIONAL

## 2021-08-17 RX ORDER — FENTANYL CITRATE 50 UG/ML
INJECTION, SOLUTION INTRAMUSCULAR; INTRAVENOUS
Status: DISCONTINUED | OUTPATIENT
Start: 2021-08-17 | End: 2021-08-17

## 2021-08-17 RX ORDER — SODIUM CHLORIDE 0.9 % (FLUSH) 0.9 %
10 SYRINGE (ML) INJECTION
Status: DISCONTINUED | OUTPATIENT
Start: 2021-08-17 | End: 2021-08-17 | Stop reason: HOSPADM

## 2021-08-17 RX ORDER — PHENYLEPHRINE HYDROCHLORIDE 10 MG/ML
INJECTION INTRAVENOUS
Status: DISCONTINUED | OUTPATIENT
Start: 2021-08-17 | End: 2021-08-17

## 2021-08-17 RX ORDER — ROCURONIUM BROMIDE 10 MG/ML
INJECTION, SOLUTION INTRAVENOUS
Status: DISCONTINUED | OUTPATIENT
Start: 2021-08-17 | End: 2021-08-17

## 2021-08-17 RX ORDER — SUCCINYLCHOLINE CHLORIDE 20 MG/ML
INJECTION INTRAMUSCULAR; INTRAVENOUS
Status: DISCONTINUED | OUTPATIENT
Start: 2021-08-17 | End: 2021-08-17

## 2021-08-17 RX ORDER — LIDOCAINE HYDROCHLORIDE 20 MG/ML
JELLY TOPICAL
Status: DISCONTINUED | OUTPATIENT
Start: 2021-08-17 | End: 2021-08-17

## 2021-08-17 RX ORDER — ONDANSETRON 2 MG/ML
4 INJECTION INTRAMUSCULAR; INTRAVENOUS DAILY PRN
Status: DISCONTINUED | OUTPATIENT
Start: 2021-08-17 | End: 2021-08-17 | Stop reason: HOSPADM

## 2021-08-17 RX ORDER — CEFAZOLIN SODIUM 1 G/3ML
INJECTION, POWDER, FOR SOLUTION INTRAMUSCULAR; INTRAVENOUS
Status: DISCONTINUED | OUTPATIENT
Start: 2021-08-17 | End: 2021-08-17

## 2021-08-17 RX ORDER — SCOLOPAMINE TRANSDERMAL SYSTEM 1 MG/1
1 PATCH, EXTENDED RELEASE TRANSDERMAL
Status: DISCONTINUED | OUTPATIENT
Start: 2021-08-17 | End: 2021-08-17 | Stop reason: HOSPADM

## 2021-08-17 RX ORDER — MEPERIDINE HYDROCHLORIDE 50 MG/ML
12.5 INJECTION INTRAMUSCULAR; INTRAVENOUS; SUBCUTANEOUS EVERY 10 MIN PRN
Status: DISCONTINUED | OUTPATIENT
Start: 2021-08-17 | End: 2021-08-17 | Stop reason: HOSPADM

## 2021-08-17 RX ORDER — ACETAMINOPHEN 325 MG/1
650 TABLET ORAL EVERY 4 HOURS PRN
Status: CANCELLED | OUTPATIENT
Start: 2021-08-17

## 2021-08-17 RX ORDER — HYDROCODONE BITARTRATE AND ACETAMINOPHEN 5; 325 MG/1; MG/1
1 TABLET ORAL EVERY 8 HOURS PRN
Qty: 10 TABLET | Refills: 0 | Status: SHIPPED | OUTPATIENT
Start: 2021-08-17 | End: 2021-09-15

## 2021-08-17 RX ORDER — HYDROCODONE BITARTRATE AND ACETAMINOPHEN 5; 325 MG/1; MG/1
1 TABLET ORAL EVERY 6 HOURS PRN
Status: CANCELLED | OUTPATIENT
Start: 2021-08-17

## 2021-08-17 RX ORDER — OXYCODONE HYDROCHLORIDE 5 MG/1
5 TABLET ORAL
Status: DISCONTINUED | OUTPATIENT
Start: 2021-08-17 | End: 2021-08-17 | Stop reason: HOSPADM

## 2021-08-17 RX ORDER — ONDANSETRON 2 MG/ML
4 INJECTION INTRAMUSCULAR; INTRAVENOUS EVERY 12 HOURS PRN
Status: CANCELLED | OUTPATIENT
Start: 2021-08-17

## 2021-08-17 RX ORDER — HYDROMORPHONE HYDROCHLORIDE 1 MG/ML
0.2 INJECTION, SOLUTION INTRAMUSCULAR; INTRAVENOUS; SUBCUTANEOUS
Status: DISCONTINUED | OUTPATIENT
Start: 2021-08-17 | End: 2021-08-17 | Stop reason: HOSPADM

## 2021-08-17 RX ORDER — PROPOFOL 10 MG/ML
VIAL (ML) INTRAVENOUS
Status: DISCONTINUED | OUTPATIENT
Start: 2021-08-17 | End: 2021-08-17

## 2021-08-17 RX ORDER — LIDOCAINE HYDROCHLORIDE 20 MG/ML
INJECTION, SOLUTION EPIDURAL; INFILTRATION; INTRACAUDAL; PERINEURAL
Status: DISCONTINUED | OUTPATIENT
Start: 2021-08-17 | End: 2021-08-17

## 2021-08-17 RX ORDER — DIPHENHYDRAMINE HYDROCHLORIDE 50 MG/ML
12.5 INJECTION INTRAMUSCULAR; INTRAVENOUS
Status: DISCONTINUED | OUTPATIENT
Start: 2021-08-17 | End: 2021-08-17 | Stop reason: HOSPADM

## 2021-08-17 RX ORDER — FAMOTIDINE 10 MG/ML
INJECTION INTRAVENOUS
Status: DISCONTINUED | OUTPATIENT
Start: 2021-08-17 | End: 2021-08-17

## 2021-08-17 RX ORDER — DEXAMETHASONE SODIUM PHOSPHATE 4 MG/ML
INJECTION, SOLUTION INTRA-ARTICULAR; INTRALESIONAL; INTRAMUSCULAR; INTRAVENOUS; SOFT TISSUE
Status: DISCONTINUED | OUTPATIENT
Start: 2021-08-17 | End: 2021-08-17

## 2021-08-17 RX ORDER — BUPIVACAINE HYDROCHLORIDE AND EPINEPHRINE 5; 5 MG/ML; UG/ML
INJECTION, SOLUTION EPIDURAL; INTRACAUDAL; PERINEURAL
Status: DISCONTINUED | OUTPATIENT
Start: 2021-08-17 | End: 2021-08-17 | Stop reason: HOSPADM

## 2021-08-17 RX ORDER — ACETAMINOPHEN 10 MG/ML
INJECTION, SOLUTION INTRAVENOUS
Status: DISCONTINUED | OUTPATIENT
Start: 2021-08-17 | End: 2021-08-17

## 2021-08-17 RX ADMIN — CEFAZOLIN 2 G: 330 INJECTION, POWDER, FOR SOLUTION INTRAMUSCULAR; INTRAVENOUS at 08:08

## 2021-08-17 RX ADMIN — ROCURONIUM BROMIDE 40 MG: 10 INJECTION, SOLUTION INTRAVENOUS at 08:08

## 2021-08-17 RX ADMIN — ACETAMINOPHEN 1000 MG: 10 INJECTION, SOLUTION INTRAVENOUS at 08:08

## 2021-08-17 RX ADMIN — SUCCINYLCHOLINE CHLORIDE 160 MG: 20 INJECTION, SOLUTION INTRAMUSCULAR; INTRAVENOUS at 08:08

## 2021-08-17 RX ADMIN — HYDROMORPHONE HYDROCHLORIDE 0.2 MG: 1 INJECTION, SOLUTION INTRAMUSCULAR; INTRAVENOUS; SUBCUTANEOUS at 10:08

## 2021-08-17 RX ADMIN — DEXAMETHASONE SODIUM PHOSPHATE 8 MG: 4 INJECTION, SOLUTION INTRAMUSCULAR; INTRAVENOUS at 08:08

## 2021-08-17 RX ADMIN — ROCURONIUM BROMIDE 10 MG: 10 INJECTION, SOLUTION INTRAVENOUS at 08:08

## 2021-08-17 RX ADMIN — PROPOFOL 150 MG: 10 INJECTION, EMULSION INTRAVENOUS at 08:08

## 2021-08-17 RX ADMIN — PHENYLEPHRINE HYDROCHLORIDE 200 MCG: 10 INJECTION INTRAVENOUS at 09:08

## 2021-08-17 RX ADMIN — LIDOCAINE HYDROCHLORIDE 100 MG: 20 INJECTION, SOLUTION EPIDURAL; INFILTRATION; INTRACAUDAL; PERINEURAL at 08:08

## 2021-08-17 RX ADMIN — FAMOTIDINE 20 MG: 10 INJECTION, SOLUTION INTRAVENOUS at 08:08

## 2021-08-17 RX ADMIN — SUGAMMADEX 200 MG: 100 INJECTION, SOLUTION INTRAVENOUS at 09:08

## 2021-08-17 RX ADMIN — ONDANSETRON 4 MG: 2 INJECTION INTRAMUSCULAR; INTRAVENOUS at 08:08

## 2021-08-17 RX ADMIN — DIPHENHYDRAMINE HYDROCHLORIDE 6.25 MG: 50 INJECTION, SOLUTION INTRAMUSCULAR; INTRAVENOUS at 08:08

## 2021-08-17 RX ADMIN — SCOPOLAMINE 1 PATCH: 1 PATCH, EXTENDED RELEASE TRANSDERMAL at 09:08

## 2021-08-17 RX ADMIN — FENTANYL CITRATE 100 MCG: 50 INJECTION, SOLUTION INTRAMUSCULAR; INTRAVENOUS at 08:08

## 2021-08-17 RX ADMIN — LIDOCAINE HYDROCHLORIDE 5 ML: 20 JELLY TOPICAL at 08:08

## 2021-08-17 RX ADMIN — OXYCODONE HYDROCHLORIDE 5 MG: 5 TABLET ORAL at 10:08

## 2021-08-17 RX ADMIN — SODIUM CHLORIDE, SODIUM LACTATE, POTASSIUM CHLORIDE, AND CALCIUM CHLORIDE: .6; .31; .03; .02 INJECTION, SOLUTION INTRAVENOUS at 08:08

## 2021-09-02 ENCOUNTER — OFFICE VISIT (OUTPATIENT)
Dept: RADIATION ONCOLOGY | Facility: CLINIC | Age: 61
End: 2021-09-02
Payer: COMMERCIAL

## 2021-09-02 VITALS
BODY MASS INDEX: 24.13 KG/M2 | SYSTOLIC BLOOD PRESSURE: 111 MMHG | OXYGEN SATURATION: 98 % | WEIGHT: 149.5 LBS | HEART RATE: 73 BPM | TEMPERATURE: 98 F | DIASTOLIC BLOOD PRESSURE: 70 MMHG

## 2021-09-02 DIAGNOSIS — D05.11 DUCTAL CARCINOMA IN SITU (DCIS) OF RIGHT BREAST: Primary | ICD-10-CM

## 2021-09-02 PROCEDURE — 3008F PR BODY MASS INDEX (BMI) DOCUMENTED: ICD-10-PCS | Mod: S$GLB,,, | Performed by: RADIOLOGY

## 2021-09-02 PROCEDURE — 3078F PR MOST RECENT DIASTOLIC BLOOD PRESSURE < 80 MM HG: ICD-10-PCS | Mod: S$GLB,,, | Performed by: RADIOLOGY

## 2021-09-02 PROCEDURE — 99205 OFFICE O/P NEW HI 60 MIN: CPT | Mod: S$GLB,,, | Performed by: RADIOLOGY

## 2021-09-02 PROCEDURE — 3074F PR MOST RECENT SYSTOLIC BLOOD PRESSURE < 130 MM HG: ICD-10-PCS | Mod: S$GLB,,, | Performed by: RADIOLOGY

## 2021-09-02 PROCEDURE — 3078F DIAST BP <80 MM HG: CPT | Mod: S$GLB,,, | Performed by: RADIOLOGY

## 2021-09-02 PROCEDURE — 99205 PR OFFICE/OUTPT VISIT, NEW, LEVL V, 60-74 MIN: ICD-10-PCS | Mod: S$GLB,,, | Performed by: RADIOLOGY

## 2021-09-02 PROCEDURE — 3074F SYST BP LT 130 MM HG: CPT | Mod: S$GLB,,, | Performed by: RADIOLOGY

## 2021-09-02 PROCEDURE — 3008F BODY MASS INDEX DOCD: CPT | Mod: S$GLB,,, | Performed by: RADIOLOGY

## 2021-09-15 ENCOUNTER — OFFICE VISIT (OUTPATIENT)
Dept: HEMATOLOGY/ONCOLOGY | Facility: CLINIC | Age: 61
End: 2021-09-15
Payer: COMMERCIAL

## 2021-09-15 VITALS
DIASTOLIC BLOOD PRESSURE: 76 MMHG | HEART RATE: 64 BPM | SYSTOLIC BLOOD PRESSURE: 132 MMHG | HEIGHT: 66 IN | RESPIRATION RATE: 18 BRPM | BODY MASS INDEX: 24.11 KG/M2 | WEIGHT: 150 LBS

## 2021-09-15 DIAGNOSIS — D05.11 DUCTAL CARCINOMA IN SITU (DCIS) OF RIGHT BREAST: ICD-10-CM

## 2021-09-15 PROCEDURE — 3075F SYST BP GE 130 - 139MM HG: CPT | Mod: S$GLB,,, | Performed by: INTERNAL MEDICINE

## 2021-09-15 PROCEDURE — 3078F PR MOST RECENT DIASTOLIC BLOOD PRESSURE < 80 MM HG: ICD-10-PCS | Mod: S$GLB,,, | Performed by: INTERNAL MEDICINE

## 2021-09-15 PROCEDURE — 3075F PR MOST RECENT SYSTOLIC BLOOD PRESS GE 130-139MM HG: ICD-10-PCS | Mod: S$GLB,,, | Performed by: INTERNAL MEDICINE

## 2021-09-15 PROCEDURE — 99204 PR OFFICE/OUTPT VISIT, NEW, LEVL IV, 45-59 MIN: ICD-10-PCS | Mod: S$GLB,,, | Performed by: INTERNAL MEDICINE

## 2021-09-15 PROCEDURE — 3008F PR BODY MASS INDEX (BMI) DOCUMENTED: ICD-10-PCS | Mod: S$GLB,,, | Performed by: INTERNAL MEDICINE

## 2021-09-15 PROCEDURE — 3078F DIAST BP <80 MM HG: CPT | Mod: S$GLB,,, | Performed by: INTERNAL MEDICINE

## 2021-09-15 PROCEDURE — 3008F BODY MASS INDEX DOCD: CPT | Mod: S$GLB,,, | Performed by: INTERNAL MEDICINE

## 2021-09-15 PROCEDURE — 1160F RVW MEDS BY RX/DR IN RCRD: CPT | Mod: S$GLB,,, | Performed by: INTERNAL MEDICINE

## 2021-09-15 PROCEDURE — 99204 OFFICE O/P NEW MOD 45 MIN: CPT | Mod: S$GLB,,, | Performed by: INTERNAL MEDICINE

## 2021-09-15 PROCEDURE — 1160F PR REVIEW ALL MEDS BY PRESCRIBER/CLIN PHARMACIST DOCUMENTED: ICD-10-PCS | Mod: S$GLB,,, | Performed by: INTERNAL MEDICINE

## 2021-09-15 RX ORDER — ERGOCALCIFEROL 1.25 MG/1
50000 CAPSULE ORAL
COMMUNITY
Start: 2021-06-18 | End: 2022-06-18

## 2021-10-13 ENCOUNTER — TREATMENT (OUTPATIENT)
Dept: RADIATION ONCOLOGY | Facility: CLINIC | Age: 61
End: 2021-10-13
Payer: COMMERCIAL

## 2021-10-13 PROCEDURE — G6012 RADIATION TREATMENT DELIVERY: HCPCS | Mod: S$GLB,,, | Performed by: RADIOLOGY

## 2021-10-13 PROCEDURE — 77014 PR  CT GUIDANCE PLACEMENT RAD THERAPY FIELDS: CPT | Mod: S$GLB,,, | Performed by: RADIOLOGY

## 2021-10-13 PROCEDURE — 77014 PR  CT GUIDANCE PLACEMENT RAD THERAPY FIELDS: ICD-10-PCS | Mod: S$GLB,,, | Performed by: RADIOLOGY

## 2021-10-13 PROCEDURE — G6012 PR RADN TX DELIVERY, 6-10 MEV, >= 3 TX AREAS: ICD-10-PCS | Mod: S$GLB,,, | Performed by: RADIOLOGY

## 2021-11-15 ENCOUNTER — OFFICE VISIT (OUTPATIENT)
Dept: HEMATOLOGY/ONCOLOGY | Facility: CLINIC | Age: 61
End: 2021-11-15
Payer: COMMERCIAL

## 2021-11-15 VITALS
SYSTOLIC BLOOD PRESSURE: 136 MMHG | WEIGHT: 152.38 LBS | DIASTOLIC BLOOD PRESSURE: 74 MMHG | BODY MASS INDEX: 23.92 KG/M2 | HEIGHT: 67 IN | HEART RATE: 74 BPM | RESPIRATION RATE: 18 BRPM

## 2021-11-15 DIAGNOSIS — D05.11 DUCTAL CARCINOMA IN SITU (DCIS) OF RIGHT BREAST: ICD-10-CM

## 2021-11-15 PROCEDURE — 99213 PR OFFICE/OUTPT VISIT, EST, LEVL III, 20-29 MIN: ICD-10-PCS | Mod: S$GLB,,, | Performed by: INTERNAL MEDICINE

## 2021-11-15 PROCEDURE — 3008F BODY MASS INDEX DOCD: CPT | Mod: S$GLB,,, | Performed by: INTERNAL MEDICINE

## 2021-11-15 PROCEDURE — 99213 OFFICE O/P EST LOW 20 MIN: CPT | Mod: S$GLB,,, | Performed by: INTERNAL MEDICINE

## 2021-11-15 PROCEDURE — 3075F PR MOST RECENT SYSTOLIC BLOOD PRESS GE 130-139MM HG: ICD-10-PCS | Mod: S$GLB,,, | Performed by: INTERNAL MEDICINE

## 2021-11-15 PROCEDURE — 3078F DIAST BP <80 MM HG: CPT | Mod: S$GLB,,, | Performed by: INTERNAL MEDICINE

## 2021-11-15 PROCEDURE — 3078F PR MOST RECENT DIASTOLIC BLOOD PRESSURE < 80 MM HG: ICD-10-PCS | Mod: S$GLB,,, | Performed by: INTERNAL MEDICINE

## 2021-11-15 PROCEDURE — 3075F SYST BP GE 130 - 139MM HG: CPT | Mod: S$GLB,,, | Performed by: INTERNAL MEDICINE

## 2021-11-15 PROCEDURE — 3008F PR BODY MASS INDEX (BMI) DOCUMENTED: ICD-10-PCS | Mod: S$GLB,,, | Performed by: INTERNAL MEDICINE

## 2021-11-15 RX ORDER — ANASTROZOLE 1 MG/1
1 TABLET ORAL DAILY
Qty: 30 TABLET | Refills: 3 | Status: SHIPPED | OUTPATIENT
Start: 2021-11-15 | End: 2022-01-13 | Stop reason: SDUPTHER

## 2022-01-11 LAB
ALBUMIN SERPL-MCNC: 4.7 G/DL (ref 3.8–4.9)
ALBUMIN/GLOB SERPL: 2 {RATIO} (ref 1.2–2.2)
ALP SERPL-CCNC: 98 IU/L (ref 44–121)
ALT SERPL-CCNC: 11 IU/L (ref 0–32)
AST SERPL-CCNC: 17 IU/L (ref 0–40)
BASOPHILS # BLD AUTO: 0 X10E3/UL (ref 0–0.2)
BASOPHILS NFR BLD AUTO: 0 %
BILIRUB SERPL-MCNC: <0.2 MG/DL (ref 0–1.2)
BUN SERPL-MCNC: 14 MG/DL (ref 8–27)
BUN/CREAT SERPL: 17 (ref 12–28)
CALCIUM SERPL-MCNC: 9.6 MG/DL (ref 8.7–10.3)
CHLORIDE SERPL-SCNC: 104 MMOL/L (ref 96–106)
CO2 SERPL-SCNC: 24 MMOL/L (ref 20–29)
CREAT SERPL-MCNC: 0.81 MG/DL (ref 0.57–1)
EOSINOPHIL # BLD AUTO: 0.1 X10E3/UL (ref 0–0.4)
EOSINOPHIL NFR BLD AUTO: 2 %
ERYTHROCYTE [DISTWIDTH] IN BLOOD BY AUTOMATED COUNT: 12.7 % (ref 11.7–15.4)
GLOBULIN SER CALC-MCNC: 2.3 G/DL (ref 1.5–4.5)
GLUCOSE SERPL-MCNC: 94 MG/DL (ref 65–99)
HCT VFR BLD AUTO: 39.1 % (ref 34–46.6)
HGB BLD-MCNC: 13 G/DL (ref 11.1–15.9)
IMM GRANULOCYTES # BLD AUTO: 0 X10E3/UL (ref 0–0.1)
IMM GRANULOCYTES NFR BLD AUTO: 0 %
LYMPHOCYTES # BLD AUTO: 1.9 X10E3/UL (ref 0.7–3.1)
LYMPHOCYTES NFR BLD AUTO: 29 %
MCH RBC QN AUTO: 32.1 PG (ref 26.6–33)
MCHC RBC AUTO-ENTMCNC: 33.2 G/DL (ref 31.5–35.7)
MCV RBC AUTO: 97 FL (ref 79–97)
MONOCYTES # BLD AUTO: 0.7 X10E3/UL (ref 0.1–0.9)
MONOCYTES NFR BLD AUTO: 11 %
NEUTROPHILS # BLD AUTO: 4 X10E3/UL (ref 1.4–7)
NEUTROPHILS NFR BLD AUTO: 58 %
PLATELET # BLD AUTO: 380 X10E3/UL (ref 150–450)
POTASSIUM SERPL-SCNC: 4.2 MMOL/L (ref 3.5–5.2)
PROT SERPL-MCNC: 7 G/DL (ref 6–8.5)
RBC # BLD AUTO: 4.05 X10E6/UL (ref 3.77–5.28)
SODIUM SERPL-SCNC: 143 MMOL/L (ref 134–144)
SPECIMEN STATUS REPORT: NORMAL
WBC # BLD AUTO: 6.8 X10E3/UL (ref 3.4–10.8)

## 2022-01-13 ENCOUNTER — OFFICE VISIT (OUTPATIENT)
Dept: HEMATOLOGY/ONCOLOGY | Facility: CLINIC | Age: 62
End: 2022-01-13
Payer: COMMERCIAL

## 2022-01-13 VITALS
TEMPERATURE: 97 F | HEIGHT: 66 IN | SYSTOLIC BLOOD PRESSURE: 153 MMHG | HEART RATE: 87 BPM | RESPIRATION RATE: 18 BRPM | DIASTOLIC BLOOD PRESSURE: 82 MMHG | WEIGHT: 153.13 LBS | BODY MASS INDEX: 24.61 KG/M2

## 2022-01-13 DIAGNOSIS — J06.9 URI, ACUTE: ICD-10-CM

## 2022-01-13 DIAGNOSIS — J32.9 SINUSITIS, UNSPECIFIED CHRONICITY, UNSPECIFIED LOCATION: ICD-10-CM

## 2022-01-13 DIAGNOSIS — Z78.0 POSTMENOPAUSAL: ICD-10-CM

## 2022-01-13 DIAGNOSIS — Z79.811 AROMATASE INHIBITOR USE: ICD-10-CM

## 2022-01-13 DIAGNOSIS — D05.11 DUCTAL CARCINOMA IN SITU (DCIS) OF RIGHT BREAST: Primary | ICD-10-CM

## 2022-01-13 PROCEDURE — 1160F PR REVIEW ALL MEDS BY PRESCRIBER/CLIN PHARMACIST DOCUMENTED: ICD-10-PCS | Mod: S$GLB,,, | Performed by: NURSE PRACTITIONER

## 2022-01-13 PROCEDURE — 3077F PR MOST RECENT SYSTOLIC BLOOD PRESSURE >= 140 MM HG: ICD-10-PCS | Mod: S$GLB,,, | Performed by: NURSE PRACTITIONER

## 2022-01-13 PROCEDURE — 3079F PR MOST RECENT DIASTOLIC BLOOD PRESSURE 80-89 MM HG: ICD-10-PCS | Mod: S$GLB,,, | Performed by: NURSE PRACTITIONER

## 2022-01-13 PROCEDURE — 3079F DIAST BP 80-89 MM HG: CPT | Mod: S$GLB,,, | Performed by: NURSE PRACTITIONER

## 2022-01-13 PROCEDURE — 1160F RVW MEDS BY RX/DR IN RCRD: CPT | Mod: S$GLB,,, | Performed by: NURSE PRACTITIONER

## 2022-01-13 PROCEDURE — 3008F PR BODY MASS INDEX (BMI) DOCUMENTED: ICD-10-PCS | Mod: S$GLB,,, | Performed by: NURSE PRACTITIONER

## 2022-01-13 PROCEDURE — 3077F SYST BP >= 140 MM HG: CPT | Mod: S$GLB,,, | Performed by: NURSE PRACTITIONER

## 2022-01-13 PROCEDURE — 99214 PR OFFICE/OUTPT VISIT, EST, LEVL IV, 30-39 MIN: ICD-10-PCS | Mod: S$GLB,,, | Performed by: NURSE PRACTITIONER

## 2022-01-13 PROCEDURE — 3008F BODY MASS INDEX DOCD: CPT | Mod: S$GLB,,, | Performed by: NURSE PRACTITIONER

## 2022-01-13 PROCEDURE — 99214 OFFICE O/P EST MOD 30 MIN: CPT | Mod: S$GLB,,, | Performed by: NURSE PRACTITIONER

## 2022-01-13 RX ORDER — DOXYCYCLINE 100 MG/1
100 CAPSULE ORAL 2 TIMES DAILY
Qty: 20 CAPSULE | Refills: 0 | Status: SHIPPED | OUTPATIENT
Start: 2022-01-13 | End: 2023-03-01

## 2022-01-13 RX ORDER — AZELASTINE 1 MG/ML
1 SPRAY, METERED NASAL 2 TIMES DAILY
Qty: 30 ML | Refills: 0 | Status: SHIPPED | OUTPATIENT
Start: 2022-01-13 | End: 2023-01-13

## 2022-01-13 RX ORDER — ANASTROZOLE 1 MG/1
1 TABLET ORAL DAILY
Qty: 30 TABLET | Refills: 5 | Status: SHIPPED | OUTPATIENT
Start: 2022-01-13 | End: 2022-04-14

## 2022-01-13 NOTE — PROGRESS NOTES
PROGRESS NOTE    Subjective:       Patient ID: Heidy Phillips is a 61 y.o. female.    6/11/2021:  2 right breast masses, 9 Oclock, 3 Oclock    7/8/2021:  Right breast US:  9 oclock: 1.1cm cystic mass  3 oclock: asymmetric fibroglandular tissue    8/2/2021:  Excisional biopsy:  1 cm DCIS: <0.1cm from lateral wall, all other margins >0.2cm  Low grade DCIS  ER: 96%, IN: neg    8/17/2021:  Re-excision of margin: ADH    10/13/2021  Completed Radiation therapy    11/15/2021  Began Anastrozole therapy    Chief Complaint:  Breast cancer follow up.     History of Present Illness:   Heidy Phillips is a 61 y.o. female who presents for follow up of breast cancer.      She has completed radiation therapy.  Patient states she is feeling well.     She does have sinus symptoms but has been covid tested and it was negative. She does have periodic dizziness and sinus congestion, ear pain. Denies any fever.      Family and Social history reviewed and is unchanged from 9/15/2021      ROS:  Review of Systems   Constitutional: Negative for fatigue and fever.   HENT: Positive for congestion and ear pain.    Respiratory: Negative for shortness of breath.    Cardiovascular: Negative for chest pain and leg swelling.   Gastrointestinal: Negative for abdominal pain and blood in stool.   Genitourinary: Negative for hematuria.   Musculoskeletal: Positive for back pain.   Skin: Negative for rash.          Current Outpatient Medications:     anastrozole (ARIMIDEX) 1 mg Tab, Take 1 tablet (1 mg total) by mouth once daily., Disp: 30 tablet, Rfl: 5    ergocalciferol (ERGOCALCIFEROL) 50,000 unit Cap, Take 50,000 Units by mouth., Disp: , Rfl:     fluocinonide (LIDEX) 0.05 % external solution, AAA scalp qday - bid prn pruritus or rash, Disp: 60 mL, Rfl: 2    azelastine (ASTELIN) 137 mcg (0.1 %) nasal spray, 1 spray (137 mcg total) by Nasal route 2 (two) times daily., Disp: 30  "mL, Rfl: 0    doxycycline (VIBRAMYCIN) 100 MG Cap, Take 1 capsule (100 mg total) by mouth 2 (two) times daily., Disp: 20 capsule, Rfl: 0        Objective:       Physical Examination:     BP (!) 153/82   Pulse 87   Temp 97.3 °F (36.3 °C)   Resp 18   Ht 5' 6" (1.676 m)   Wt 69.4 kg (153 lb 1.6 oz)   BMI 24.71 kg/m²     Physical Exam  Constitutional:       Appearance: Normal appearance. She is well-developed.   HENT:      Head: Normocephalic and atraumatic.      Right Ear: External ear normal.      Left Ear: External ear normal.   Eyes:      Conjunctiva/sclera: Conjunctivae normal.      Pupils: Pupils are equal, round, and reactive to light.   Neck:      Thyroid: No thyromegaly.      Trachea: No tracheal deviation.   Cardiovascular:      Rate and Rhythm: Normal rate and regular rhythm.      Heart sounds: Normal heart sounds.   Pulmonary:      Effort: Pulmonary effort is normal.      Breath sounds: Normal breath sounds.   Chest:   Breasts:      Right: Normal. No axillary adenopathy or supraclavicular adenopathy.      Left: Normal. No axillary adenopathy or supraclavicular adenopathy.         Abdominal:      General: Bowel sounds are normal. There is no distension.      Palpations: Abdomen is soft. There is no mass.      Tenderness: There is no abdominal tenderness.   Lymphadenopathy:      Upper Body:      Right upper body: No supraclavicular, axillary or pectoral adenopathy.      Left upper body: No supraclavicular, axillary or pectoral adenopathy.   Skin:     Findings: No rash.   Neurological:      Mental Status: She is alert.      Comments: Neuro intact througout   Psychiatric:         Behavior: Behavior normal.         Thought Content: Thought content normal.         Judgment: Judgment normal.         Labs:   No results found for this or any previous visit (from the past 336 hour(s)).  CMP  Sodium   Date Value Ref Range Status   01/10/2022 143 134 - 144 mmol/L Final   09/28/2017 142 134 - 144 mmol/L  "     Potassium   Date Value Ref Range Status   01/10/2022 4.2 3.5 - 5.2 mmol/L Final     Chloride   Date Value Ref Range Status   01/10/2022 104 96 - 106 mmol/L Final   09/28/2017 105 98 - 110 mmol/L      CO2   Date Value Ref Range Status   01/10/2022 24 20 - 29 mmol/L Final     Glucose   Date Value Ref Range Status   01/10/2022 94 65 - 99 mg/dL Final   09/28/2017 89 70 - 99 mg/dL      BUN   Date Value Ref Range Status   01/10/2022 14 8 - 27 mg/dL Final     Creatinine   Date Value Ref Range Status   01/10/2022 0.81 0.57 - 1.00 mg/dL Final   09/28/2017 0.94 0.60 - 1.40 mg/dL      Calcium   Date Value Ref Range Status   01/10/2022 9.6 8.7 - 10.3 mg/dL Final     Total Protein   Date Value Ref Range Status   09/28/2017 7.4 6.0 - 8.2 g/dL      Albumin   Date Value Ref Range Status   01/10/2022 4.7 3.8 - 4.9 g/dL Final   09/28/2017 4.6 3.1 - 4.7 g/dL      Total Bilirubin   Date Value Ref Range Status   01/10/2022 <0.2 0.0 - 1.2 mg/dL Final     Alkaline Phosphatase   Date Value Ref Range Status   09/28/2017 71 40 - 104 IU/L      AST   Date Value Ref Range Status   01/10/2022 17 0 - 40 IU/L Final     ALT   Date Value Ref Range Status   01/10/2022 11 0 - 32 IU/L Final     Anion Gap   Date Value Ref Range Status   07/30/2021 9 8 - 16 mmol/L Final     eGFR if    Date Value Ref Range Status   07/30/2021 >60.0 >60 mL/min/1.73 m^2 Final     eGFR if non    Date Value Ref Range Status   01/10/2022 79 >59 mL/min/1.73 Final     No results found for: CEA  No results found for: PSA        Assessment/Plan:     Problem List Items Addressed This Visit        Oncology    Ductal carcinoma in situ (DCIS) of right breast - Primary    Relevant Medications    anastrozole (ARIMIDEX) 1 mg Tab    Other Relevant Orders    Mammo Digital Diagnostic Bilat with Miller    CBC Auto Differential    CMP    DXA Bone Density Spine And Hip      Other Visit Diagnoses     Postmenopausal        Relevant Orders    DXA Bone Density  Spine And Hip    Aromatase inhibitor use        Relevant Orders    DXA Bone Density Spine And Hip    Sinusitis, unspecified chronicity, unspecified location        Relevant Medications    azelastine (ASTELIN) 137 mcg (0.1 %) nasal spray    doxycycline (VIBRAMYCIN) 100 MG Cap        1. DCIS- Continue Anastrozole and Mammo due in June.  2. Postmenopausal- Baseline Dexa now  3. Aromatase inhibitor use- Dexa now  4. Sinusitis- Doxy and Astelin    Discussion:     Follow up in about 3 months (around 4/13/2022) for with Dr. Altamirano.      Electronically signed by Kierra Jessica, MSN, APRN, AGNP-C, OCN

## 2022-01-19 ENCOUNTER — HOSPITAL ENCOUNTER (OUTPATIENT)
Dept: RADIOLOGY | Facility: HOSPITAL | Age: 62
Discharge: HOME | End: 2022-01-19
Attending: NURSE PRACTITIONER
Payer: COMMERCIAL

## 2022-01-19 DIAGNOSIS — Z79.811 AROMATASE INHIBITOR USE: ICD-10-CM

## 2022-01-19 DIAGNOSIS — Z78.0 POSTMENOPAUSAL: ICD-10-CM

## 2022-01-19 DIAGNOSIS — D05.11 DUCTAL CARCINOMA IN SITU (DCIS) OF RIGHT BREAST: ICD-10-CM

## 2022-01-19 PROCEDURE — 77080 DXA BONE DENSITY AXIAL: CPT | Mod: TC,PO

## 2022-01-20 ENCOUNTER — TELEPHONE (OUTPATIENT)
Dept: HEMATOLOGY/ONCOLOGY | Facility: CLINIC | Age: 62
End: 2022-01-20
Payer: COMMERCIAL

## 2022-01-20 NOTE — TELEPHONE ENCOUNTER
----- Message from REJI Sanches sent at 1/20/2022  8:57 AM CST -----  Please let her know she has Osteoporosis she should be taking Oscal twice a day and we need to start her on a bone strengthener. We can do an every 6 mth injection with Prolia or I can send this to Dr. Campos and she can discuss a pill Fosamax etc...) with him    Kierra

## 2022-03-05 LAB
ALBUMIN SERPL-MCNC: 5.2 G/DL (ref 3.8–4.9)
ALBUMIN/GLOB SERPL: 2.3 {RATIO} (ref 1.2–2.2)
ALP SERPL-CCNC: 93 IU/L (ref 44–121)
ALT SERPL-CCNC: 8 IU/L (ref 0–32)
AST SERPL-CCNC: 14 IU/L (ref 0–40)
BASOPHILS # BLD AUTO: 0 X10E3/UL (ref 0–0.2)
BASOPHILS NFR BLD AUTO: 0 %
BILIRUB SERPL-MCNC: 0.3 MG/DL (ref 0–1.2)
BUN SERPL-MCNC: 15 MG/DL (ref 8–27)
BUN/CREAT SERPL: 16 (ref 12–28)
CALCIUM SERPL-MCNC: 9.9 MG/DL (ref 8.7–10.3)
CHLORIDE SERPL-SCNC: 101 MMOL/L (ref 96–106)
CO2 SERPL-SCNC: 21 MMOL/L (ref 20–29)
CREAT SERPL-MCNC: 0.95 MG/DL (ref 0.57–1)
EOSINOPHIL # BLD AUTO: 0 X10E3/UL (ref 0–0.4)
EOSINOPHIL NFR BLD AUTO: 0 %
ERYTHROCYTE [DISTWIDTH] IN BLOOD BY AUTOMATED COUNT: 13 % (ref 11.7–15.4)
EST. GFR  (NO RACE VARIABLE): 69 ML/MIN/1.73
GLOBULIN SER CALC-MCNC: 2.3 G/DL (ref 1.5–4.5)
GLUCOSE SERPL-MCNC: 90 MG/DL (ref 65–99)
HCT VFR BLD AUTO: 43.3 % (ref 34–46.6)
HGB BLD-MCNC: 13.8 G/DL (ref 11.1–15.9)
IMM GRANULOCYTES # BLD AUTO: 0 X10E3/UL (ref 0–0.1)
IMM GRANULOCYTES NFR BLD AUTO: 0 %
LYMPHOCYTES # BLD AUTO: 2.2 X10E3/UL (ref 0.7–3.1)
LYMPHOCYTES NFR BLD AUTO: 36 %
MCH RBC QN AUTO: 31.9 PG (ref 26.6–33)
MCHC RBC AUTO-ENTMCNC: 31.9 G/DL (ref 31.5–35.7)
MCV RBC AUTO: 100 FL (ref 79–97)
MONOCYTES # BLD AUTO: 0.8 X10E3/UL (ref 0.1–0.9)
MONOCYTES NFR BLD AUTO: 13 %
NEUTROPHILS # BLD AUTO: 3.1 X10E3/UL (ref 1.4–7)
NEUTROPHILS NFR BLD AUTO: 51 %
PLATELET # BLD AUTO: 319 X10E3/UL (ref 150–450)
POTASSIUM SERPL-SCNC: 4.6 MMOL/L (ref 3.5–5.2)
PROT SERPL-MCNC: 7.5 G/DL (ref 6–8.5)
RBC # BLD AUTO: 4.33 X10E6/UL (ref 3.77–5.28)
SODIUM SERPL-SCNC: 140 MMOL/L (ref 134–144)
WBC # BLD AUTO: 6 X10E3/UL (ref 3.4–10.8)

## 2022-04-14 ENCOUNTER — OFFICE VISIT (OUTPATIENT)
Dept: HEMATOLOGY/ONCOLOGY | Facility: CLINIC | Age: 62
End: 2022-04-14
Payer: COMMERCIAL

## 2022-04-14 VITALS
DIASTOLIC BLOOD PRESSURE: 78 MMHG | HEIGHT: 66 IN | TEMPERATURE: 98 F | SYSTOLIC BLOOD PRESSURE: 124 MMHG | BODY MASS INDEX: 24.71 KG/M2 | RESPIRATION RATE: 18 BRPM | HEART RATE: 64 BPM

## 2022-04-14 DIAGNOSIS — M81.0 AGE-RELATED OSTEOPOROSIS WITHOUT CURRENT PATHOLOGICAL FRACTURE: ICD-10-CM

## 2022-04-14 DIAGNOSIS — D05.11 DUCTAL CARCINOMA IN SITU (DCIS) OF RIGHT BREAST: ICD-10-CM

## 2022-04-14 PROCEDURE — 1160F RVW MEDS BY RX/DR IN RCRD: CPT | Mod: S$GLB,,, | Performed by: INTERNAL MEDICINE

## 2022-04-14 PROCEDURE — 99214 PR OFFICE/OUTPT VISIT, EST, LEVL IV, 30-39 MIN: ICD-10-PCS | Mod: S$GLB,,, | Performed by: INTERNAL MEDICINE

## 2022-04-14 PROCEDURE — 3008F PR BODY MASS INDEX (BMI) DOCUMENTED: ICD-10-PCS | Mod: S$GLB,,, | Performed by: INTERNAL MEDICINE

## 2022-04-14 PROCEDURE — 3078F DIAST BP <80 MM HG: CPT | Mod: S$GLB,,, | Performed by: INTERNAL MEDICINE

## 2022-04-14 PROCEDURE — 3008F BODY MASS INDEX DOCD: CPT | Mod: S$GLB,,, | Performed by: INTERNAL MEDICINE

## 2022-04-14 PROCEDURE — 3074F SYST BP LT 130 MM HG: CPT | Mod: S$GLB,,, | Performed by: INTERNAL MEDICINE

## 2022-04-14 PROCEDURE — 99214 OFFICE O/P EST MOD 30 MIN: CPT | Mod: S$GLB,,, | Performed by: INTERNAL MEDICINE

## 2022-04-14 PROCEDURE — 3074F PR MOST RECENT SYSTOLIC BLOOD PRESSURE < 130 MM HG: ICD-10-PCS | Mod: S$GLB,,, | Performed by: INTERNAL MEDICINE

## 2022-04-14 PROCEDURE — 1160F PR REVIEW ALL MEDS BY PRESCRIBER/CLIN PHARMACIST DOCUMENTED: ICD-10-PCS | Mod: S$GLB,,, | Performed by: INTERNAL MEDICINE

## 2022-04-14 PROCEDURE — 3078F PR MOST RECENT DIASTOLIC BLOOD PRESSURE < 80 MM HG: ICD-10-PCS | Mod: S$GLB,,, | Performed by: INTERNAL MEDICINE

## 2022-04-14 RX ORDER — TAMOXIFEN CITRATE 20 MG/1
20 TABLET ORAL DAILY
Qty: 30 TABLET | Refills: 11 | Status: SHIPPED | OUTPATIENT
Start: 2022-04-14 | End: 2023-03-01 | Stop reason: SDUPTHER

## 2022-04-14 NOTE — ASSESSMENT & PLAN NOTE
Patient is doing well on AI but will need to change to Sheikh as above.  Will continue to see her on a three month basis and will check CBC, CMP with next visit.

## 2022-04-14 NOTE — ASSESSMENT & PLAN NOTE
This is a new diagnosis and I discussed this with her today.  Will place her on Prolia and reviewed this medication with her.  Will begin ASAP.  I also dicussed using Tamoxifen here instead of the AI therapy which will certainly worsen this disease.  Reviewed the DVT risk with her and she is ok moving forward with ELIZALDE.

## 2022-04-14 NOTE — PROGRESS NOTES
PROGRESS NOTE    Subjective:       Patient ID: Heidy Phillips is a 62 y.o. female.    6/11/2021:  2 right breast masses, 9 Oclock, 3 Oclock    7/8/2021:  Right breast US:  9 oclock: 1.1cm cystic mass  3 oclock: asymmetric fibroglandular tissue    8/2/2021:  Excisional biopsy:  1 cm DCIS: <0.1cm from lateral wall, all other margins >0.2cm  Low grade DCIS  ER: 96%, [place]: neg    8/17/2021:  Re-excision of margin: ADH    10/13/2021  Completed Radiation therapy    11/15/2021  Began Anastrozole therapy    1/19/2022:  Dexa:  osteoporosis    Chief Complaint:  No chief complaint on file.  Breast cancer follow up.     History of Present Illness:   Heidy Phillips is a 62 y.o. female who presents for follow up of breast cancer.      Patient is doing well on AI.  No new complaints today.       Family and Social history reviewed and is unchanged from 9/15/2021      ROS:  Review of Systems   Constitutional: Negative for appetite change, fever and unexpected weight change.   HENT: Negative for mouth sores.    Eyes: Negative for visual disturbance.   Respiratory: Negative for cough and shortness of breath.    Cardiovascular: Negative for chest pain and leg swelling.   Gastrointestinal: Negative for abdominal pain, blood in stool and diarrhea.   Genitourinary: Positive for frequency. Negative for hematuria.   Musculoskeletal: Positive for back pain.   Skin: Negative for rash.   Neurological: Negative for headaches.   Hematological: Negative for adenopathy.   Psychiatric/Behavioral: The patient is not nervous/anxious.           Current Outpatient Medications:     azelastine (ASTELIN) 137 mcg (0.1 %) nasal spray, 1 spray (137 mcg total) by Nasal route 2 (two) times daily., Disp: 30 mL, Rfl: 0    doxycycline (VIBRAMYCIN) 100 MG Cap, Take 1 capsule (100 mg total) by mouth 2 (two) times daily., Disp: 20 capsule, Rfl: 0    ergocalciferol (ERGOCALCIFEROL) 50,000 unit  "Cap, Take 50,000 Units by mouth., Disp: , Rfl:     fluocinonide (LIDEX) 0.05 % external solution, AAA scalp qday - bid prn pruritus or rash, Disp: 60 mL, Rfl: 2    tamoxifen (NOLVADEX) 20 MG Tab, Take 1 tablet (20 mg total) by mouth once daily., Disp: 30 tablet, Rfl: 11        Objective:       Physical Examination:     /78   Pulse 64   Temp 97.6 °F (36.4 °C)   Resp 18   Ht 5' 6" (1.676 m)   BMI 24.71 kg/m²     Physical Exam  Constitutional:       Appearance: She is well-developed.   HENT:      Head: Normocephalic and atraumatic.      Right Ear: External ear normal.      Left Ear: External ear normal.   Eyes:      Conjunctiva/sclera: Conjunctivae normal.      Pupils: Pupils are equal, round, and reactive to light.   Neck:      Thyroid: No thyromegaly.      Trachea: No tracheal deviation.   Cardiovascular:      Rate and Rhythm: Normal rate and regular rhythm.      Heart sounds: Normal heart sounds.   Pulmonary:      Effort: Pulmonary effort is normal.      Breath sounds: Normal breath sounds.   Abdominal:      General: Bowel sounds are normal. There is no distension.      Palpations: Abdomen is soft. There is no mass.      Tenderness: There is no abdominal tenderness.   Skin:     Findings: No rash.   Neurological:      Comments: Neuro intact througout   Psychiatric:         Behavior: Behavior normal.         Thought Content: Thought content normal.         Judgment: Judgment normal.         Labs:   No results found for this or any previous visit (from the past 336 hour(s)).  CMP  Sodium   Date Value Ref Range Status   03/04/2022 140 134 - 144 mmol/L Final   09/28/2017 142 134 - 144 mmol/L      Potassium   Date Value Ref Range Status   03/04/2022 4.6 3.5 - 5.2 mmol/L Final     Chloride   Date Value Ref Range Status   03/04/2022 101 96 - 106 mmol/L Final   09/28/2017 105 98 - 110 mmol/L      CO2   Date Value Ref Range Status   03/04/2022 21 20 - 29 mmol/L Final     Glucose   Date Value Ref Range Status "   03/04/2022 90 65 - 99 mg/dL Final   09/28/2017 89 70 - 99 mg/dL      BUN   Date Value Ref Range Status   03/04/2022 15 8 - 27 mg/dL Final     Creatinine   Date Value Ref Range Status   03/04/2022 0.95 0.57 - 1.00 mg/dL Final   09/28/2017 0.94 0.60 - 1.40 mg/dL      Calcium   Date Value Ref Range Status   03/04/2022 9.9 8.7 - 10.3 mg/dL Final     Total Protein   Date Value Ref Range Status   09/28/2017 7.4 6.0 - 8.2 g/dL      Albumin   Date Value Ref Range Status   03/04/2022 5.2 (H) 3.8 - 4.9 g/dL Final   09/28/2017 4.6 3.1 - 4.7 g/dL      Total Bilirubin   Date Value Ref Range Status   03/04/2022 0.3 0.0 - 1.2 mg/dL Final     Alkaline Phosphatase   Date Value Ref Range Status   09/28/2017 71 40 - 104 IU/L      AST   Date Value Ref Range Status   03/04/2022 14 0 - 40 IU/L Final     ALT   Date Value Ref Range Status   03/04/2022 8 0 - 32 IU/L Final     Anion Gap   Date Value Ref Range Status   07/30/2021 9 8 - 16 mmol/L Final     eGFR if    Date Value Ref Range Status   07/30/2021 >60.0 >60 mL/min/1.73 m^2 Final     eGFR if non    Date Value Ref Range Status   01/10/2022 79 >59 mL/min/1.73 Final     No results found for: CEA  No results found for: PSA        Assessment/Plan:     Problem List Items Addressed This Visit     Ductal carcinoma in situ (DCIS) of right breast     Patient is doing well on AI but will need to change to Sheikh as above.  Will continue to see her on a three month basis and will check CBC, CMP with next visit.             Relevant Medications    tamoxifen (NOLVADEX) 20 MG Tab    Other Relevant Orders    CBC Auto Differential    Comprehensive Metabolic Panel    Age-related osteoporosis without current pathological fracture     This is a new diagnosis and I discussed this with her today.  Will place her on Prolia and reviewed this medication with her.  Will begin ASAP.  I also dicussed using Tamoxifen here instead of the AI therapy which will certainly worsen this  disease.  Reviewed the DVT risk with her and she is ok moving forward with ELIZALDE.             Relevant Orders    CBC Auto Differential    Comprehensive Metabolic Panel          Discussion:     Follow up in about 3 months (around 7/14/2022).      Electronically signed by El Moncada

## 2022-04-18 ENCOUNTER — TELEPHONE (OUTPATIENT)
Dept: HEMATOLOGY/ONCOLOGY | Facility: CLINIC | Age: 62
End: 2022-04-18

## 2022-04-18 DIAGNOSIS — M81.0 AGE-RELATED OSTEOPOROSIS WITHOUT CURRENT PATHOLOGICAL FRACTURE: Primary | ICD-10-CM

## 2022-04-25 ENCOUNTER — TELEPHONE (OUTPATIENT)
Dept: INFUSION THERAPY | Facility: HOSPITAL | Age: 62
End: 2022-04-25

## 2022-04-25 NOTE — TELEPHONE ENCOUNTER
Left voicemail to inform patient that we need her calcium level checked prior to her injection tomorrow. Call back numbers given.

## 2022-05-20 DIAGNOSIS — D05.11 DUCTAL CARCINOMA IN SITU (DCIS) OF RIGHT BREAST: Primary | ICD-10-CM

## 2022-06-13 ENCOUNTER — HOSPITAL ENCOUNTER (OUTPATIENT)
Dept: RADIOLOGY | Facility: HOSPITAL | Age: 62
Discharge: HOME | End: 2022-06-13
Attending: NURSE PRACTITIONER
Payer: COMMERCIAL

## 2022-06-13 DIAGNOSIS — D05.11 DUCTAL CARCINOMA IN SITU (DCIS) OF RIGHT BREAST: ICD-10-CM

## 2022-06-13 PROCEDURE — 77062 BREAST TOMOSYNTHESIS BI: CPT | Mod: TC,PO

## 2022-07-30 LAB
ALBUMIN SERPL-MCNC: 4.3 G/DL (ref 3.8–4.8)
ALBUMIN/GLOB SERPL: 2.3 {RATIO} (ref 1.2–2.2)
ALP SERPL-CCNC: 86 IU/L (ref 44–121)
ALT SERPL-CCNC: 13 IU/L (ref 0–32)
AST SERPL-CCNC: 17 IU/L (ref 0–40)
BASOPHILS # BLD AUTO: 0 X10E3/UL (ref 0–0.2)
BASOPHILS NFR BLD AUTO: 0 %
BILIRUB SERPL-MCNC: 0.4 MG/DL (ref 0–1.2)
BUN SERPL-MCNC: 9 MG/DL (ref 8–27)
BUN/CREAT SERPL: 11 (ref 12–28)
CALCIUM SERPL-MCNC: 9.4 MG/DL (ref 8.7–10.3)
CHLORIDE SERPL-SCNC: 106 MMOL/L (ref 96–106)
CO2 SERPL-SCNC: 23 MMOL/L (ref 20–29)
CREAT SERPL-MCNC: 0.82 MG/DL (ref 0.57–1)
EOSINOPHIL # BLD AUTO: 0 X10E3/UL (ref 0–0.4)
EOSINOPHIL NFR BLD AUTO: 0 %
ERYTHROCYTE [DISTWIDTH] IN BLOOD BY AUTOMATED COUNT: 13.3 % (ref 11.7–15.4)
EST. GFR  (NO RACE VARIABLE): 81 ML/MIN/1.73
GLOBULIN SER CALC-MCNC: 1.9 G/DL (ref 1.5–4.5)
GLUCOSE SERPL-MCNC: 94 MG/DL (ref 65–99)
HCT VFR BLD AUTO: 40.3 % (ref 34–46.6)
HGB BLD-MCNC: 13.4 G/DL (ref 11.1–15.9)
IMM GRANULOCYTES # BLD AUTO: 0 X10E3/UL (ref 0–0.1)
IMM GRANULOCYTES NFR BLD AUTO: 0 %
LYMPHOCYTES # BLD AUTO: 2.3 X10E3/UL (ref 0.7–3.1)
LYMPHOCYTES NFR BLD AUTO: 42 %
MCH RBC QN AUTO: 31.8 PG (ref 26.6–33)
MCHC RBC AUTO-ENTMCNC: 33.3 G/DL (ref 31.5–35.7)
MCV RBC AUTO: 96 FL (ref 79–97)
MONOCYTES # BLD AUTO: 0.7 X10E3/UL (ref 0.1–0.9)
MONOCYTES NFR BLD AUTO: 13 %
NEUTROPHILS # BLD AUTO: 2.5 X10E3/UL (ref 1.4–7)
NEUTROPHILS NFR BLD AUTO: 45 %
PLATELET # BLD AUTO: 294 X10E3/UL (ref 150–450)
POTASSIUM SERPL-SCNC: 4.3 MMOL/L (ref 3.5–5.2)
PROT SERPL-MCNC: 6.2 G/DL (ref 6–8.5)
RBC # BLD AUTO: 4.22 X10E6/UL (ref 3.77–5.28)
SODIUM SERPL-SCNC: 141 MMOL/L (ref 134–144)
SPECIMEN STATUS REPORT: NORMAL
WBC # BLD AUTO: 5.5 X10E3/UL (ref 3.4–10.8)

## 2022-08-01 NOTE — PROGRESS NOTES
PROGRESS NOTE    Subjective:       Patient ID: Heidy Phillips is a 62 y.o. female.    6/11/2021:  2 right breast masses, 9 Oclock, 3 Oclock    7/8/2021:  Right breast US:  9 oclock: 1.1cm cystic mass  3 oclock: asymmetric fibroglandular tissue    8/2/2021:  Excisional biopsy:  1 cm DCIS: <0.1cm from lateral wall, all other margins >0.2cm  Low grade DCIS  ER: 96%, [place]: neg    8/17/2021:  Re-excision of margin: ADH    10/13/2021  Completed Radiation therapy    11/15/2021  Began Anastrozole therapy  4/14/2022:  Changed to tamoxifen due to osteoporosis    1/19/2022:  Dexa:  osteoporosis  Patient holding off on bisphosphonate due to dental work(8/2/2022)    6/13/2022:  Bilateral mammogram negative    Chief Complaint:  No chief complaint on file.  DCIS follow up.     History of Present Illness:   Heidy Phillips is a 62 y.o. female who presents for follow up of breast DCIS    Patient is doing well today and has no new complaints.   On tamoxifen as of today, 8/2/2022    She did not get prolia done due to a large amount of dental work being done.        Family and Social history reviewed and is unchanged from 9/15/2021      ROS:  Review of Systems   Constitutional: Negative for appetite change, fever and unexpected weight change.   HENT: Negative for mouth sores.    Eyes: Negative for visual disturbance.   Respiratory: Negative for cough and shortness of breath.    Cardiovascular: Negative for chest pain and leg swelling.   Gastrointestinal: Negative for abdominal pain, blood in stool and diarrhea.   Genitourinary: Positive for frequency. Negative for hematuria.   Musculoskeletal: Positive for back pain.   Skin: Negative for rash.   Neurological: Negative for headaches.   Hematological: Negative for adenopathy.   Psychiatric/Behavioral: The patient is not nervous/anxious.           Current Outpatient Medications:     azelastine (ASTELIN) 137 mcg (0.1 %)  "nasal spray, 1 spray (137 mcg total) by Nasal route 2 (two) times daily., Disp: 30 mL, Rfl: 0    tamoxifen (NOLVADEX) 20 MG Tab, Take 1 tablet (20 mg total) by mouth once daily., Disp: 30 tablet, Rfl: 11    doxycycline (VIBRAMYCIN) 100 MG Cap, Take 1 capsule (100 mg total) by mouth 2 (two) times daily., Disp: 20 capsule, Rfl: 0    fluocinonide (LIDEX) 0.05 % external solution, AAA scalp qday - bid prn pruritus or rash, Disp: 60 mL, Rfl: 2        Objective:       Physical Examination:     /73   Pulse 60   Temp 97.9 °F (36.6 °C)   Resp 18   Ht 5' 6" (1.676 m)   Wt 71.2 kg (157 lb)   BMI 25.34 kg/m²     Physical Exam  Constitutional:       Appearance: She is well-developed.   HENT:      Head: Normocephalic and atraumatic.      Right Ear: External ear normal.      Left Ear: External ear normal.   Eyes:      Conjunctiva/sclera: Conjunctivae normal.      Pupils: Pupils are equal, round, and reactive to light.   Neck:      Thyroid: No thyromegaly.      Trachea: No tracheal deviation.   Cardiovascular:      Rate and Rhythm: Normal rate and regular rhythm.      Heart sounds: Normal heart sounds.   Pulmonary:      Effort: Pulmonary effort is normal.      Breath sounds: Normal breath sounds.   Abdominal:      General: Bowel sounds are normal. There is no distension.      Palpations: Abdomen is soft. There is no mass.      Tenderness: There is no abdominal tenderness.   Skin:     Findings: No rash.   Neurological:      Comments: Neuro intact througout   Psychiatric:         Behavior: Behavior normal.         Thought Content: Thought content normal.         Judgment: Judgment normal.         Labs:   No results found for this or any previous visit (from the past 336 hour(s)).  CMP  Sodium   Date Value Ref Range Status   07/29/2022 141 134 - 144 mmol/L Final   09/28/2017 142 134 - 144 mmol/L      Potassium   Date Value Ref Range Status   07/29/2022 4.3 3.5 - 5.2 mmol/L Final     Chloride   Date Value Ref Range " Status   07/29/2022 106 96 - 106 mmol/L Final   09/28/2017 105 98 - 110 mmol/L      CO2   Date Value Ref Range Status   07/29/2022 23 20 - 29 mmol/L Final     Glucose   Date Value Ref Range Status   07/29/2022 94 65 - 99 mg/dL Final   09/28/2017 89 70 - 99 mg/dL      BUN   Date Value Ref Range Status   07/29/2022 9 8 - 27 mg/dL Final     Creatinine   Date Value Ref Range Status   07/29/2022 0.82 0.57 - 1.00 mg/dL Final   09/28/2017 0.94 0.60 - 1.40 mg/dL      Calcium   Date Value Ref Range Status   07/29/2022 9.4 8.7 - 10.3 mg/dL Final     Total Protein   Date Value Ref Range Status   09/28/2017 7.4 6.0 - 8.2 g/dL      Albumin   Date Value Ref Range Status   07/29/2022 4.3 3.8 - 4.8 g/dL Final   09/28/2017 4.6 3.1 - 4.7 g/dL      Total Bilirubin   Date Value Ref Range Status   07/29/2022 0.4 0.0 - 1.2 mg/dL Final     Alkaline Phosphatase   Date Value Ref Range Status   09/28/2017 71 40 - 104 IU/L      AST   Date Value Ref Range Status   07/29/2022 17 0 - 40 IU/L Final     ALT   Date Value Ref Range Status   07/29/2022 13 0 - 32 IU/L Final     Anion Gap   Date Value Ref Range Status   07/30/2021 9 8 - 16 mmol/L Final     eGFR if    Date Value Ref Range Status   07/30/2021 >60.0 >60 mL/min/1.73 m^2 Final     eGFR if non    Date Value Ref Range Status   01/10/2022 79 >59 mL/min/1.73 Final     No results found for: CEA  No results found for: PSA        Assessment/Plan:     Problem List Items Addressed This Visit     Ductal carcinoma in situ (DCIS) of right breast     Patient is doing well and appears TRAM.  Exam and mammogram are negative and she is tolearating the Sheikh well.  Will continue therapy and continue to see her every six months.            Relevant Orders    CBC Auto Differential    Comprehensive Metabolic Panel    Age-related osteoporosis without current pathological fracture     Patient has not yet started bisphosphonate due to dental work.  She will notify when this is done.             Relevant Orders    CBC Auto Differential    Comprehensive Metabolic Panel          Discussion:     Follow up in about 6 months (around 2/2/2023).      Electronically signed by El Moncada

## 2022-08-02 ENCOUNTER — OFFICE VISIT (OUTPATIENT)
Dept: HEMATOLOGY/ONCOLOGY | Facility: CLINIC | Age: 62
End: 2022-08-02
Payer: COMMERCIAL

## 2022-08-02 VITALS
HEART RATE: 60 BPM | TEMPERATURE: 98 F | BODY MASS INDEX: 25.23 KG/M2 | SYSTOLIC BLOOD PRESSURE: 138 MMHG | DIASTOLIC BLOOD PRESSURE: 73 MMHG | WEIGHT: 157 LBS | RESPIRATION RATE: 18 BRPM | HEIGHT: 66 IN

## 2022-08-02 DIAGNOSIS — M81.0 AGE-RELATED OSTEOPOROSIS WITHOUT CURRENT PATHOLOGICAL FRACTURE: ICD-10-CM

## 2022-08-02 DIAGNOSIS — D05.11 DUCTAL CARCINOMA IN SITU (DCIS) OF RIGHT BREAST: ICD-10-CM

## 2022-08-02 PROCEDURE — 3008F PR BODY MASS INDEX (BMI) DOCUMENTED: ICD-10-PCS | Mod: CPTII,S$GLB,, | Performed by: INTERNAL MEDICINE

## 2022-08-02 PROCEDURE — 3008F BODY MASS INDEX DOCD: CPT | Mod: CPTII,S$GLB,, | Performed by: INTERNAL MEDICINE

## 2022-08-02 PROCEDURE — 3078F PR MOST RECENT DIASTOLIC BLOOD PRESSURE < 80 MM HG: ICD-10-PCS | Mod: CPTII,S$GLB,, | Performed by: INTERNAL MEDICINE

## 2022-08-02 PROCEDURE — 3075F SYST BP GE 130 - 139MM HG: CPT | Mod: CPTII,S$GLB,, | Performed by: INTERNAL MEDICINE

## 2022-08-02 PROCEDURE — 1159F MED LIST DOCD IN RCRD: CPT | Mod: CPTII,S$GLB,, | Performed by: INTERNAL MEDICINE

## 2022-08-02 PROCEDURE — 3078F DIAST BP <80 MM HG: CPT | Mod: CPTII,S$GLB,, | Performed by: INTERNAL MEDICINE

## 2022-08-02 PROCEDURE — 99213 OFFICE O/P EST LOW 20 MIN: CPT | Mod: S$GLB,,, | Performed by: INTERNAL MEDICINE

## 2022-08-02 PROCEDURE — 1159F PR MEDICATION LIST DOCUMENTED IN MEDICAL RECORD: ICD-10-PCS | Mod: CPTII,S$GLB,, | Performed by: INTERNAL MEDICINE

## 2022-08-02 PROCEDURE — 99213 PR OFFICE/OUTPT VISIT, EST, LEVL III, 20-29 MIN: ICD-10-PCS | Mod: S$GLB,,, | Performed by: INTERNAL MEDICINE

## 2022-08-02 PROCEDURE — 3075F PR MOST RECENT SYSTOLIC BLOOD PRESS GE 130-139MM HG: ICD-10-PCS | Mod: CPTII,S$GLB,, | Performed by: INTERNAL MEDICINE

## 2022-08-02 NOTE — ASSESSMENT & PLAN NOTE
Patient is doing well and appears TRAM.  Exam and mammogram are negative and she is tolearating the Sheikh well.  Will continue therapy and continue to see her every six months.

## 2022-08-02 NOTE — ASSESSMENT & PLAN NOTE
Patient has not yet started bisphosphonate due to dental work.  She will notify when this is done.

## 2023-02-01 ENCOUNTER — TELEPHONE (OUTPATIENT)
Dept: HEMATOLOGY/ONCOLOGY | Facility: CLINIC | Age: 63
End: 2023-02-01

## 2023-02-01 NOTE — TELEPHONE ENCOUNTER
Called patient regarding upcoming appointment and laboratory work for 02/02/2023, patient did not answer, LVM.

## 2023-02-23 ENCOUNTER — LAB VISIT (OUTPATIENT)
Dept: LAB | Facility: HOSPITAL | Age: 63
End: 2023-02-23
Attending: INTERNAL MEDICINE
Payer: COMMERCIAL

## 2023-02-23 DIAGNOSIS — M81.0 AGE-RELATED OSTEOPOROSIS WITHOUT CURRENT PATHOLOGICAL FRACTURE: ICD-10-CM

## 2023-02-23 DIAGNOSIS — D05.11 DUCTAL CARCINOMA IN SITU (DCIS) OF RIGHT BREAST: ICD-10-CM

## 2023-02-23 LAB
ALBUMIN SERPL BCP-MCNC: 4.7 G/DL (ref 3.5–5.2)
ALP SERPL-CCNC: 79 U/L (ref 55–135)
ALT SERPL W/O P-5'-P-CCNC: 12 U/L (ref 10–44)
ANION GAP SERPL CALC-SCNC: 10 MMOL/L (ref 8–16)
AST SERPL-CCNC: 16 U/L (ref 10–40)
BASOPHILS # BLD AUTO: 0.07 K/UL (ref 0–0.2)
BASOPHILS NFR BLD: 1 % (ref 0–1.9)
BILIRUB SERPL-MCNC: 0.2 MG/DL (ref 0.1–1)
BUN SERPL-MCNC: 15 MG/DL (ref 8–23)
CALCIUM SERPL-MCNC: 9.4 MG/DL (ref 8.7–10.5)
CHLORIDE SERPL-SCNC: 103 MMOL/L (ref 95–110)
CO2 SERPL-SCNC: 26 MMOL/L (ref 23–29)
CREAT SERPL-MCNC: 0.8 MG/DL (ref 0.5–1.4)
DIFFERENTIAL METHOD: ABNORMAL
EOSINOPHIL # BLD AUTO: 0.1 K/UL (ref 0–0.5)
EOSINOPHIL NFR BLD: 1 % (ref 0–8)
ERYTHROCYTE [DISTWIDTH] IN BLOOD BY AUTOMATED COUNT: 13.6 % (ref 11.5–14.5)
EST. GFR  (NO RACE VARIABLE): >60 ML/MIN/1.73 M^2
GLUCOSE SERPL-MCNC: 88 MG/DL (ref 70–110)
HCT VFR BLD AUTO: 39.6 % (ref 37–48.5)
HGB BLD-MCNC: 13.2 G/DL (ref 12–16)
IMM GRANULOCYTES # BLD AUTO: 0.02 K/UL (ref 0–0.04)
IMM GRANULOCYTES NFR BLD AUTO: 0.3 % (ref 0–0.5)
LYMPHOCYTES # BLD AUTO: 2.5 K/UL (ref 1–4.8)
LYMPHOCYTES NFR BLD: 34.2 % (ref 18–48)
MCH RBC QN AUTO: 32.5 PG (ref 27–31)
MCHC RBC AUTO-ENTMCNC: 33.3 G/DL (ref 32–36)
MCV RBC AUTO: 98 FL (ref 82–98)
MONOCYTES # BLD AUTO: 0.8 K/UL (ref 0.3–1)
MONOCYTES NFR BLD: 11.5 % (ref 4–15)
NEUTROPHILS # BLD AUTO: 3.8 K/UL (ref 1.8–7.7)
NEUTROPHILS NFR BLD: 52 % (ref 38–73)
NRBC BLD-RTO: 0 /100 WBC
PLATELET # BLD AUTO: 277 K/UL (ref 150–450)
PMV BLD AUTO: 10.1 FL (ref 9.2–12.9)
POTASSIUM SERPL-SCNC: 4.1 MMOL/L (ref 3.5–5.1)
PROT SERPL-MCNC: 7.5 G/DL (ref 6–8.4)
RBC # BLD AUTO: 4.06 M/UL (ref 4–5.4)
SODIUM SERPL-SCNC: 139 MMOL/L (ref 136–145)
WBC # BLD AUTO: 7.31 K/UL (ref 3.9–12.7)

## 2023-02-23 PROCEDURE — 36415 COLL VENOUS BLD VENIPUNCTURE: CPT | Performed by: INTERNAL MEDICINE

## 2023-02-23 PROCEDURE — 85025 COMPLETE CBC W/AUTO DIFF WBC: CPT | Performed by: INTERNAL MEDICINE

## 2023-02-23 PROCEDURE — 80053 COMPREHEN METABOLIC PANEL: CPT | Performed by: INTERNAL MEDICINE

## 2023-03-01 ENCOUNTER — OFFICE VISIT (OUTPATIENT)
Dept: HEMATOLOGY/ONCOLOGY | Facility: CLINIC | Age: 63
End: 2023-03-01
Payer: COMMERCIAL

## 2023-03-01 VITALS
WEIGHT: 147 LBS | HEIGHT: 67 IN | DIASTOLIC BLOOD PRESSURE: 69 MMHG | RESPIRATION RATE: 18 BRPM | TEMPERATURE: 98 F | SYSTOLIC BLOOD PRESSURE: 138 MMHG | HEART RATE: 65 BPM | BODY MASS INDEX: 23.07 KG/M2

## 2023-03-01 DIAGNOSIS — D05.11 DUCTAL CARCINOMA IN SITU (DCIS) OF RIGHT BREAST: Primary | ICD-10-CM

## 2023-03-01 PROCEDURE — 3078F DIAST BP <80 MM HG: CPT | Mod: CPTII,S$GLB,, | Performed by: NURSE PRACTITIONER

## 2023-03-01 PROCEDURE — 1159F MED LIST DOCD IN RCRD: CPT | Mod: CPTII,S$GLB,, | Performed by: NURSE PRACTITIONER

## 2023-03-01 PROCEDURE — 1160F RVW MEDS BY RX/DR IN RCRD: CPT | Mod: CPTII,S$GLB,, | Performed by: NURSE PRACTITIONER

## 2023-03-01 PROCEDURE — 3075F SYST BP GE 130 - 139MM HG: CPT | Mod: CPTII,S$GLB,, | Performed by: NURSE PRACTITIONER

## 2023-03-01 PROCEDURE — 3075F PR MOST RECENT SYSTOLIC BLOOD PRESS GE 130-139MM HG: ICD-10-PCS | Mod: CPTII,S$GLB,, | Performed by: NURSE PRACTITIONER

## 2023-03-01 PROCEDURE — 99213 PR OFFICE/OUTPT VISIT, EST, LEVL III, 20-29 MIN: ICD-10-PCS | Mod: S$GLB,,, | Performed by: NURSE PRACTITIONER

## 2023-03-01 PROCEDURE — 3008F BODY MASS INDEX DOCD: CPT | Mod: CPTII,S$GLB,, | Performed by: NURSE PRACTITIONER

## 2023-03-01 PROCEDURE — 1159F PR MEDICATION LIST DOCUMENTED IN MEDICAL RECORD: ICD-10-PCS | Mod: CPTII,S$GLB,, | Performed by: NURSE PRACTITIONER

## 2023-03-01 PROCEDURE — 99213 OFFICE O/P EST LOW 20 MIN: CPT | Mod: S$GLB,,, | Performed by: NURSE PRACTITIONER

## 2023-03-01 PROCEDURE — 1160F PR REVIEW ALL MEDS BY PRESCRIBER/CLIN PHARMACIST DOCUMENTED: ICD-10-PCS | Mod: CPTII,S$GLB,, | Performed by: NURSE PRACTITIONER

## 2023-03-01 PROCEDURE — 3078F PR MOST RECENT DIASTOLIC BLOOD PRESSURE < 80 MM HG: ICD-10-PCS | Mod: CPTII,S$GLB,, | Performed by: NURSE PRACTITIONER

## 2023-03-01 PROCEDURE — 3008F PR BODY MASS INDEX (BMI) DOCUMENTED: ICD-10-PCS | Mod: CPTII,S$GLB,, | Performed by: NURSE PRACTITIONER

## 2023-03-01 RX ORDER — TAMOXIFEN CITRATE 20 MG/1
20 TABLET ORAL DAILY
Qty: 30 TABLET | Refills: 11 | Status: SHIPPED | OUTPATIENT
Start: 2023-03-01 | End: 2024-02-29

## 2023-03-01 NOTE — PROGRESS NOTES
PROGRESS NOTE    Subjective:       Patient ID: Heidy Phillips is a 62 y.o. female.    6/11/2021:  2 right breast masses, 9 Oclock, 3 Oclock    7/8/2021:  Right breast US:  9 oclock: 1.1cm cystic mass  3 oclock: asymmetric fibroglandular tissue    8/2/2021:  Excisional biopsy:  1 cm DCIS: <0.1cm from lateral wall, all other margins >0.2cm  Low grade DCIS  ER: 96%, [place]: neg    8/17/2021:  Re-excision of margin: ADH    10/13/2021  Completed Radiation therapy    11/15/2021  Began Anastrozole therapy  4/14/2022:  Changed to tamoxifen due to osteoporosis    1/19/2022:  Dexa:  osteoporosis  Patient holding off on bisphosphonate due to dental work(8/2/2022)    6/13/2022:  Bilateral mammogram negative    Chief Complaint:  No chief complaint on file.  DCIS follow up.     History of Present Illness:   Heidy Phillips is a 62 y.o. female who presents for follow up of breast DCIS    Patient is doing well today and has no new complaints. Pap smear due May 2023; Mammogram due June 2023    On tamoxifen as of today, 3/1/2023    She did not get prolia done due to a large amount of dental work being done.        Family and Social history reviewed and is unchanged from 9/15/2021      ROS:  Review of Systems   Constitutional:  Negative for appetite change, fever and unexpected weight change.   HENT:  Negative for mouth sores.    Eyes:  Negative for visual disturbance.   Respiratory:  Negative for cough and shortness of breath.    Cardiovascular:  Negative for chest pain and leg swelling.   Gastrointestinal:  Negative for abdominal pain, blood in stool and diarrhea.   Genitourinary:  Negative for frequency and hematuria.   Musculoskeletal:  Positive for back pain.   Skin:  Negative for rash.   Neurological:  Negative for headaches.   Hematological:  Negative for adenopathy.   Psychiatric/Behavioral:  The patient is not nervous/anxious.         Current Outpatient  "Medications:     azelastine (ASTELIN) 137 mcg (0.1 %) nasal spray, 1 spray (137 mcg total) by Nasal route 2 (two) times daily., Disp: 30 mL, Rfl: 0    tamoxifen (NOLVADEX) 20 MG Tab, Take 1 tablet (20 mg total) by mouth once daily., Disp: 30 tablet, Rfl: 11        Objective:       Physical Examination:     /69   Pulse 65   Temp 98.3 °F (36.8 °C)   Resp 18   Ht 5' 6.5" (1.689 m)   Wt 66.7 kg (147 lb)   BMI 23.37 kg/m²     Physical Exam  Constitutional:       Appearance: Normal appearance. She is well-developed.   HENT:      Head: Normocephalic and atraumatic.      Right Ear: External ear normal.      Left Ear: External ear normal.      Nose: Nose normal.      Mouth/Throat:      Mouth: Mucous membranes are moist.   Eyes:      Conjunctiva/sclera: Conjunctivae normal.      Pupils: Pupils are equal, round, and reactive to light.   Neck:      Thyroid: No thyromegaly.      Trachea: No tracheal deviation.   Cardiovascular:      Rate and Rhythm: Normal rate and regular rhythm.      Heart sounds: Normal heart sounds.   Pulmonary:      Effort: Pulmonary effort is normal.      Breath sounds: Normal breath sounds.   Chest:   Breasts:     Left: Normal.       Abdominal:      General: Bowel sounds are normal. There is no distension.      Palpations: Abdomen is soft. There is no mass.      Tenderness: There is no abdominal tenderness.   Musculoskeletal:      Right lower leg: No edema.      Left lower leg: No edema.   Lymphadenopathy:      Upper Body:      Right upper body: No supraclavicular, axillary or pectoral adenopathy.      Left upper body: No supraclavicular, axillary or pectoral adenopathy.   Skin:     General: Skin is warm and dry.      Findings: No rash.   Neurological:      General: No focal deficit present.      Mental Status: She is alert and oriented to person, place, and time.      Comments: Neuro intact througout   Psychiatric:         Mood and Affect: Mood normal.         Behavior: Behavior normal.       "   Thought Content: Thought content normal.         Judgment: Judgment normal.       Labs:   Recent Results (from the past 336 hour(s))   CBC Auto Differential    Collection Time: 02/23/23  8:15 AM   Result Value Ref Range    WBC 7.31 3.90 - 12.70 K/uL    Hemoglobin 13.2 12.0 - 16.0 g/dL    Hematocrit 39.6 37.0 - 48.5 %    Platelets 277 150 - 450 K/uL     CMP  Sodium   Date Value Ref Range Status   02/23/2023 139 136 - 145 mmol/L Final   09/28/2017 142 134 - 144 mmol/L      Potassium   Date Value Ref Range Status   02/23/2023 4.1 3.5 - 5.1 mmol/L Final     Chloride   Date Value Ref Range Status   02/23/2023 103 95 - 110 mmol/L Final   09/28/2017 105 98 - 110 mmol/L      CO2   Date Value Ref Range Status   02/23/2023 26 23 - 29 mmol/L Final     Glucose   Date Value Ref Range Status   02/23/2023 88 70 - 110 mg/dL Final   09/28/2017 89 70 - 99 mg/dL      BUN   Date Value Ref Range Status   02/23/2023 15 8 - 23 mg/dL Final     Creatinine   Date Value Ref Range Status   02/23/2023 0.8 0.5 - 1.4 mg/dL Final   09/28/2017 0.94 0.60 - 1.40 mg/dL      Calcium   Date Value Ref Range Status   02/23/2023 9.4 8.7 - 10.5 mg/dL Final     Total Protein   Date Value Ref Range Status   02/23/2023 7.5 6.0 - 8.4 g/dL Final     Albumin   Date Value Ref Range Status   02/23/2023 4.7 3.5 - 5.2 g/dL Final   09/28/2017 4.6 3.1 - 4.7 g/dL      Total Bilirubin   Date Value Ref Range Status   02/23/2023 0.2 0.1 - 1.0 mg/dL Final     Comment:     For infants and newborns, interpretation of results should be based  on gestational age, weight and in agreement with clinical  observations.    Premature Infant recommended reference ranges:  Up to 24 hours.............<8.0 mg/dL  Up to 48 hours............<12.0 mg/dL  3-5 days..................<15.0 mg/dL  6-29 days.................<15.0 mg/dL       Alkaline Phosphatase   Date Value Ref Range Status   02/23/2023 79 55 - 135 U/L Final     AST   Date Value Ref Range Status   02/23/2023 16 10 - 40 U/L  Final     ALT   Date Value Ref Range Status   02/23/2023 12 10 - 44 U/L Final     Anion Gap   Date Value Ref Range Status   02/23/2023 10 8 - 16 mmol/L Final     eGFR if    Date Value Ref Range Status   07/30/2021 >60.0 >60 mL/min/1.73 m^2 Final     eGFR if non    Date Value Ref Range Status   01/10/2022 79 >59 mL/min/1.73 Final     No results found for: CEA  No results found for: PSA        Assessment/Plan:     Problem List Items Addressed This Visit          Oncology    Ductal carcinoma in situ (DCIS) of right breast - Primary    Relevant Medications    tamoxifen (NOLVADEX) 20 MG Tab    Other Relevant Orders    Mammo Digital Diagnostic Bilat with [facility]    CBC Auto Differential    CMP     Right Breast DCIS- Continue Tamoxifen; Mammo due June 2023  Osteoporosis- Continue to hold Prolia due to dental work.    Discussion:     Follow up in about 6 months (around 9/1/2023).      Electronically signed by Kierar Jessica, MSN, APRN, AGNP-C, OCN

## 2023-05-18 DIAGNOSIS — D05.11 INTRADUCTAL CARCINOMA IN SITU OF RIGHT BREAST: Primary | ICD-10-CM

## 2023-08-15 ENCOUNTER — HOSPITAL ENCOUNTER (OUTPATIENT)
Dept: RADIOLOGY | Facility: HOSPITAL | Age: 63
Discharge: HOME | End: 2023-08-15
Attending: NURSE PRACTITIONER
Payer: COMMERCIAL

## 2023-08-15 VITALS — HEIGHT: 67 IN | BODY MASS INDEX: 23.08 KG/M2 | WEIGHT: 147.06 LBS

## 2023-08-15 DIAGNOSIS — D05.11 DUCTAL CARCINOMA IN SITU (DCIS) OF RIGHT BREAST: ICD-10-CM

## 2023-08-15 DIAGNOSIS — D05.11 INTRADUCTAL CARCINOMA IN SITU OF RIGHT BREAST: ICD-10-CM

## 2023-08-15 PROCEDURE — 77062 BREAST TOMOSYNTHESIS BI: CPT | Mod: TC,PO

## 2024-08-15 ENCOUNTER — HOSPITAL ENCOUNTER (OUTPATIENT)
Dept: RADIOLOGY | Facility: HOSPITAL | Age: 64
Discharge: HOME | End: 2024-08-15
Attending: INTERNAL MEDICINE
Payer: COMMERCIAL

## 2024-08-15 DIAGNOSIS — Z12.31 ENCOUNTER FOR SCREENING MAMMOGRAM FOR BREAST CANCER: ICD-10-CM

## 2024-08-15 PROCEDURE — 77067 SCR MAMMO BI INCL CAD: CPT | Mod: 26,,, | Performed by: RADIOLOGY

## 2024-08-15 PROCEDURE — 77067 SCR MAMMO BI INCL CAD: CPT | Mod: TC,PO

## 2024-08-15 PROCEDURE — 77063 BREAST TOMOSYNTHESIS BI: CPT | Mod: 26,,, | Performed by: RADIOLOGY

## 2025-09-03 ENCOUNTER — HOSPITAL ENCOUNTER (OUTPATIENT)
Dept: RADIOLOGY | Facility: HOSPITAL | Age: 65
Discharge: HOME | End: 2025-09-03
Attending: INTERNAL MEDICINE
Payer: COMMERCIAL

## 2025-09-03 DIAGNOSIS — Z12.31 ENCOUNTER FOR SCREENING MAMMOGRAM FOR BREAST CANCER: ICD-10-CM

## 2025-09-03 PROCEDURE — 77063 BREAST TOMOSYNTHESIS BI: CPT | Mod: TC,PO

## 2025-09-03 PROCEDURE — 77063 BREAST TOMOSYNTHESIS BI: CPT | Mod: 26,,, | Performed by: RADIOLOGY

## 2025-09-03 PROCEDURE — 77067 SCR MAMMO BI INCL CAD: CPT | Mod: 26,,, | Performed by: RADIOLOGY

## (undated) DEVICE — STOCKINETTE IMPERVIOUS 9X44

## (undated) DEVICE — TRAY GENERAL SURGERY

## (undated) DEVICE — COVER LIGHT HANDLE LB53

## (undated) DEVICE — SPONGE GAUZE 10S 4X4  442214

## (undated) DEVICE — PAD BOVIE ADULT

## (undated) DEVICE — Device

## (undated) DEVICE — SYRINGE SAFETY 1ML TB 27GA X 1/2 305789

## (undated) DEVICE — ADHESIVE TISSUE EXOFIN

## (undated) DEVICE — TIP BOVIE ENT 2.75      E1455

## (undated) DEVICE — SUCTION YANKAUER BULB TIP W/O VENT

## (undated) DEVICE — BANDAGE ACE STERILE 4 REB3114

## (undated) DEVICE — DRESSING TEGADERM 4X4 3/4 TD1004

## (undated) DEVICE — SUTURE VICRYL 3-0 CT-1 27 J258H

## (undated) DEVICE — SUTURE SILK 2-0 PS 18 1588H

## (undated) DEVICE — DRAPE NAVIGATOR GAMMA PROBE 098004

## (undated) DEVICE — BLADE ELECTRODE EDGE INSULATED 4  INCH

## (undated) DEVICE — SYRINGE HYPODERMC LL 22G 1.5 ECLIPSE 30

## (undated) DEVICE — TOWEL OR BLUE      MDT2168284

## (undated) DEVICE — SUTURE VICRYL 4-0 18 VCP773D

## (undated) DEVICE — GLOVE SURGICAL SIZE 7 STRAW POLYISOPRENE POWDER FREE

## (undated) DEVICE — SUTURE SILK 2-0 SH 30 K833H

## (undated) DEVICE — DRAPE LAPAROTOMY TRANSVERSE 89281

## (undated) DEVICE — SUTURE SILK 3-0 18 A184H

## (undated) DEVICE — LOCALIZER 20 PROBE SET US

## (undated) DEVICE — DRESSING POST OP MEPILEX AG 4X6  498300

## (undated) DEVICE — ADHESIVE DERMABOND SKIN DNX12

## (undated) DEVICE — SOLUTION IRRI NS BOTTLE 1000ML R5200-01

## (undated) DEVICE — DISSECTOR EXACT LIGASURE 20.6MM-21CM

## (undated) DEVICE — DRAPE 3/4

## (undated) DEVICE — SUTURE CHROMIC 4-0 SH 27 G121H

## (undated) DEVICE — SPONGE GAUZE 2S 4X4 STERILE NON21424

## (undated) DEVICE — BLADE SCALPEL #15 371115

## (undated) DEVICE — GLOVE BIOGEL MICRO SURGEON PINK SZ 6